# Patient Record
Sex: FEMALE | Race: WHITE | NOT HISPANIC OR LATINO | Employment: FULL TIME | URBAN - METROPOLITAN AREA
[De-identification: names, ages, dates, MRNs, and addresses within clinical notes are randomized per-mention and may not be internally consistent; named-entity substitution may affect disease eponyms.]

---

## 2017-08-21 ENCOUNTER — ALLSCRIPTS OFFICE VISIT (OUTPATIENT)
Dept: OTHER | Facility: OTHER | Age: 42
End: 2017-08-21

## 2017-08-21 DIAGNOSIS — I10 ESSENTIAL (PRIMARY) HYPERTENSION: ICD-10-CM

## 2017-08-21 DIAGNOSIS — E55.9 VITAMIN D DEFICIENCY: ICD-10-CM

## 2017-08-24 ENCOUNTER — GENERIC CONVERSION - ENCOUNTER (OUTPATIENT)
Dept: OTHER | Facility: OTHER | Age: 42
End: 2017-08-24

## 2018-01-09 NOTE — MISCELLANEOUS
Message  Return to work or school:   Hood Adamson is under my professional care   She was seen in my office on 11/7/2016   She is able to return to work on  11/8/2016       Andres Gonzalez MD       Signatures   Electronically signed by : Andres Gonzalez MD; Nov 7 2016  5:03PM EST                       (Author)

## 2018-01-13 VITALS
OXYGEN SATURATION: 98 % | SYSTOLIC BLOOD PRESSURE: 122 MMHG | RESPIRATION RATE: 18 BRPM | BODY MASS INDEX: 26.75 KG/M2 | HEIGHT: 62 IN | DIASTOLIC BLOOD PRESSURE: 76 MMHG | HEART RATE: 78 BPM | WEIGHT: 145.38 LBS | TEMPERATURE: 98.6 F

## 2018-01-13 NOTE — MISCELLANEOUS
Message   Recorded as Task   Date: 10/11/2016 02:54 PM, Created By: Celestino Schreiber   Task Name: Med Renewal Request   Assigned To: Koby Mantilla   Regarding Patient: Zeke Ying, Status: Active   CommentJerald Rocha - 11 Oct 2016 2:54 PM     TASK CREATED  PT CALLED FOR A REFILL ON HER TRINESSA LO TO BE SENT TO [facility] ON [address] - 11 Oct 2016 2:57 PM     TASK EDITED                 sent to pharm garcia due in dec        Active Problems    1  Acne (706 1) (L70 9)   2  Allergic rhinitis (477 9) (J30 9)   3  Allergy (995 3) (T78 40XA)   4  Contraceptive use (V25 40) (Z30 40)   5  Encounter for screening mammogram for malignant neoplasm of breast (V76 12)   (Z12 31)   6  Essential hypertriglyceridemia (272 1) (E78 1)   7  Hypertension (401 9) (I10)   8  Need for prophylactic vaccination and inoculation against influenza (V04 81) (Z23)   9  Routine Gynecological Exam With Cervical Pap Smear (V72 31)   10  Visit for screening mammogram (V76 12) (Z12 31)   11  Vitamin D deficiency (268 9) (E55 9)    Current Meds   1  Benzonatate 200 MG Oral Capsule; TAKE 1 CAPSULE 3 TIMES DAILY AS NEEDED; Therapy: 18CLK0930 to (Evaluate:17Jan2016)  Requested for: 63ZEE5627; Last   Rx:07Jan2016 Ordered   2  Bisoprolol-Hydrochlorothiazide 2 5-6 25 MG Oral Tablet (Ziac); Take 1 tablet daily; Therapy: 62Wmt6604 to (Jules Dalton)  Requested for: 84ABL2988; Last   Rx:10Lvn5884 Ordered   3  Calcium 1000 + D 1000-800 MG-UNIT Oral Tablet; Take 1 tablet daily; Therapy: 23Pct4538 to (Last Rx:69Qhs9756) Ordered   4  Fluticasone Propionate 50 MCG/ACT Nasal Suspension; USE 1-2 SPRAYS IN EACH   NOSTRIL ONCE DAILY PRN; Therapy: 20LLG7036 to (Last BOB:18URA7409)  Requested for: 89ZEN5236 Ordered   5  Ortho Tri-Cyclen Lo 0 18/0 215/0 25 MG-25 MCG Oral Tablet (Norgestim-Eth Estrad   Triphasic); TAKE ONE TABLET BY MOUTH EVERY DAY; Therapy: 68IMS4585 to (Evaluate:37Lis0828)  Requested for: 08Sep2015;  Last Rx:73Xrt2094 Ordered   6  Ortho Tri-Cyclen Lo 0 18/0 215/0 25 MG-25 MCG Oral Tablet; TAKE 1 TABLET DAILY; Therapy: 76UDV4125 to ([place])  Requested for: 87Oxo1296; Last   Rx:19Rwl4049 Ordered   7  Spironolactone 25 MG Oral Tablet (Aldactone); TAKE 1 TABLET DAILY; Therapy: 63Bhf4700 to (Evaluate:59Awf1679)  Requested for: 79FYA9222; Last   Rx:30Paa0945 Ordered    Allergies    1   No Known Drug Allergies    Plan  Acne, Health Maintenance    · Ortho Tri-Cyclen Lo 0 18/0 215/0 25 MG-25 MCG Oral Tablet (Norgestim-Eth  Estrad Triphasic); TAKE 1 TABLET DAILY    Signatures   Electronically signed by : Becky Hernandez, ; Oct 11 2016  2:57PM EST                       (Author)

## 2018-01-13 NOTE — RESULT NOTES
Verified Results  (1) CBC/PLT/DIFF 46TFA1018 11:05AM [facility]     Test Name Result Flag Reference   WBC COUNT 6 79 Thousand/uL  4 31-10 16   RBC COUNT 4 84 Million/uL  3 81-5 12   HEMOGLOBIN 14 3 g/dL  11 5-15 4   HEMATOCRIT 41 1 %  34 8-46  1   MCV 85 fL  82-98   MCH 29 5 pg  26 8-34 3   MCHC 34 8 g/dL  31 4-37 4   RDW 12 7 %  11 6-15 1   MPV 10 3 fL  8 9-12 7   PLATELET COUNT 018 Thousands/uL  149-390   nRBC AUTOMATED 0 /100 WBCs     NEUTROPHILS RELATIVE PERCENT 58 %  43-75   LYMPHOCYTES RELATIVE PERCENT 33 %  14-44   MONOCYTES RELATIVE PERCENT 7 %  4-12   EOSINOPHILS RELATIVE PERCENT 2 %  0-6   BASOPHILS RELATIVE PERCENT 0 %  0-1   NEUTROPHILS ABSOLUTE COUNT 3 98 Thousands/µL  1 85-7 62   LYMPHOCYTES ABSOLUTE COUNT 2 21 Thousands/µL  0 60-4 47   MONOCYTES ABSOLUTE COUNT 0 44 Thousand/µL  0 17-1 22   EOSINOPHILS ABSOLUTE COUNT 0 12 Thousand/µL  0 00-0 61   BASOPHILS ABSOLUTE COUNT 0 03 Thousands/µL  0 00-0 10     (1) COMPREHENSIVE METABOLIC PANEL 68MHD2535 29:25HP Lou Parikh 54 Kidney Disease Education Program recommendations are as follows:  GFR calculation is accurate only with a steady state creatinine  Chronic Kidney disease less than 60 ml/min/1 73 sq  meters  Kidney failure less than 15 ml/min/1 73 sq  meters  Test Name Result Flag Reference   GLUCOSE,RANDM 80 mg/dL     If the patient is fasting, the ADA then defines impaired fasting glucose as > 100 mg/dL and diabetes as > or equal to 123 mg/dL     SODIUM 137 mmol/L  136-145   POTASSIUM 4 3 mmol/L  3 5-5 3   CHLORIDE 102 mmol/L  100-108   CARBON DIOXIDE 28 mmol/L  21-32   ANION GAP (CALC) 7 mmol/L  4-13   BLOOD UREA NITROGEN 9 mg/dL  5-25   CREATININE 0 65 mg/dL  0 60-1 30   Standardized to IDMS reference method   CALCIUM 8 8 mg/dL  8 3-10 1   BILI, TOTAL 0 80 mg/dL  0 20-1 00   ALK PHOSPHATAS 45 U/L L    ALT (SGPT) 18 U/L  12-78   AST(SGOT) 14 U/L  5-45   ALBUMIN 3 6 g/dL  3 5-5 0   TOTAL PROTEIN 7 0 g/dL 6  4-8 2   eGFR Non-African American      >60 0 ml/min/1 73sq m     (1) TSH 55GNO8192 11:05AM [facility]   Patients undergoing fluorescein dye angiography may retain small amounts of fluorescein in the body for 48-72 hours post procedure  Samples containing fluorescein can produce falsely depressed TSH values  If the patient had this procedure,a specimen should be resubmitted post fluorescein clearance  The recommended reference ranges for TSH during pregnancy are as follows:  First trimester 0 1 to 2 5 uIU/mL  Second trimester  0 2 to 3 0 uIU/mL  Third trimester 0 3 to 3 0 uIU/m     Test Name Result Flag Reference   TSH 3 630 uIU/mL  0 358-3 740     (1) LIPID PANEL, FASTING 09CSZ3875 11:05AM [facility]   Triglyceride:         Normal              <150 mg/dl       Borderline High    150-199 mg/dl       High               200-499 mg/dl       Very High          >499 mg/dl  Cholesterol:         Desirable        <200 mg/dl      Borderline High  200-239 mg/dl      High             >239 mg/dl  HDL Cholesterol:        High    >59 mg/dL      Low     <41 mg/dL  LDL CALCULATED:    This screening LDL is a calculated result  It does not have the accuracy of the Direct Measured LDL in the monitoring of patients with hyperlipidemia and/or statin therapy  Direct Measure LDL (IOD043) must be ordered separately in these patients  Test Name Result Flag Reference   CHOLESTEROL 229 mg/dL H    HDL,DIRECT 45 mg/dL  40-60   LDL CHOLESTEROL CALCULATED 128 mg/dL H 0-100   TRIGLYCERIDES 282 mg/dL H <=150     (1) VITAMIN D 25-HYDROXY 59HNJ4513 11:05AM [facility]     Test Name Result Flag Reference   VIT D 25-HYDROX 33 8 ng/mL  30 0-100 0     (1) ALLERGY, NORTHEAST PANEL ADULT 59VIC0113 11:05AM [facility]   As in all diagnostic testing, a diagnosis should be made by the physician based on both test results and patient clinical history       Test Name Result Flag Reference   ALTERNARIA ALTERNATA 0 28 kUA/I H 0 00-0 09   ASPERGILLUS FUMIGATUS <0 10 kUA/I  0 00-0 09   BERMUDA GRASS <0 10 kUA/I  0 00-0 09   ALLERGEN MAPLE/BOX ELDER 0 29 kUA/I H 0 00-0 09   ALLERGEN CAT EPITHELIUM-DANDER <0 10 kUA/I  0 00-0 09   CLADOSPORIUM HERBARUM <0 10 kUA/I  0 00-0 09   COCKROACH 0 31 kUA/I H 0 00-0 09   ALLERGEN, COMMON SILVER BIRCH 3 19 kUA/I H 0 00-0 09   ALLERGEN, C[place] 0 13 kUA/I H 0 00-0 09   D  FARINAE 0 14 kUA/I H 0 00-0 09   D   PTERONYSSINUS 0 22 kUA/I H 0 00-0 09   DOG DANDER <0 10 kUA/I  0 00-0 09   ALLERGEN ELM 0 22 kUA/I H 0 00-0 09   M[place] TREE <0 10 kUA/I  0 00-0 09   MOUSE URINE <0 10 kUA/I  0 00-0 09   ALLERGEN MUGWORT IGE <0 10 kUA/I  0 00-0 09   MULBERRY TREE <0 10 kUA/I  0 00-0 09   ALLERGEN, [place] 1 29 kUA/I H 0 00-0 09   PENICILLIUM CHRYSOGENUM <0 10 kUA/I  0 00-0 09   ALLERGEN ROUGH PIGWEED (W14) IGE <0 10 kUA/I  0 00-0 09   COMMON RAGWEED 0 13 kUA/I H 0 00-0 09   ALLERGEN SHEEP SORREL (W18) IGE 0 11 kUA/I H 0 00-0 09   [place] TREE 0 13 kUA/I H 0 00-0 09   ISABEL GRASS <0 10 kUA/I  0 00-0 09   W[place] TREE 0 16 kUA/I H 0 00-0 09   WHITE LUCI TREE 4 82 kUA/I H 0 00-0 09   TOTAL  kU/l H 0-113   ALLERGEN COMMENT See Below

## 2018-01-16 NOTE — PROGRESS NOTES
Assessment    1  Encounter for preventive health examination (V70 0) (Z00 00)   2  Allergic rhinitis (477 9) (J30 9)   3  Acne (706 1) (L70 9)   4  Essential hypertriglyceridemia (272 1) (E78 1)   5  Hypertension (401 9) (I10)    Plan  Acne, Health Maintenance    · Ortho Tri-Cyclen Lo 0 18/0 215/0 25 MG-25 MCG Oral Tablet (Norgestim-Eth Estrad  Triphasic)  Allergic rhinitis    · ProAir  (90 Base) MCG/ACT Inhalation Aerosol Solution; INHALE 1 TO 2  PUFFS EVERY 4 TO 6 HOURS AS NEEDED  Contraceptive use    · Ortho Tri-Cyclen Lo 0 18/0 215/0 25 MG-25 MCG Oral Tablet (Norgestim-Eth Estrad  Triphasic)  Encounter for screening mammogram for malignant neoplasm of breast    · * MAMMO SCREENING BILATERAL W CAD; Status:Hold For - Scheduling; Requested  for:07Nov2016;   Essential hypertriglyceridemia    · (1) CBC/PLT/DIFF; Status:Active; Requested for:07Nov2016;    · (1) COMPREHENSIVE METABOLIC PANEL; Status:Active; Requested for:07Nov2016;    · (1) LIPID PANEL, FASTING; Status:Active; Requested for:07Nov2016;   Essential hypertriglyceridemia, Hypertension    · (1) TSH; Status:Active; Requested NTV:66ABM5551;   SocHx: Working Full Time    · Fluticasone Propionate 50 MCG/ACT Nasal Suspension  Vitamin D deficiency    · (1) VITAMIN D 25-HYDROXY; Status:Active; Requested for:07Nov2016;     Discussion/Summary  health maintenance visit Currently, she eats an adequate diet  cervical cancer screening is needed every three years Breast cancer screening: mammogram has been ordered  Colorectal cancer screening: colorectal cancer screening is not indicated  Osteoporosis screening: bone mineral density testing is not indicated  Screening lab work includes hemoglobin, glucose, lipid profile, thyroid function testing and 25-hydroxyvitamin D  The immunizations are up to date  Advice and education were given regarding nutrition, aerobic exercise and vitamin D supplements  Patient discussion: discussed with the patient         1  Rhinitis, allergy  Symptoms improved, saw allergy  Now using rescue inhaler as needed, off nasal sprays  2  HTN  BP controlled on bisoprolol and HCTZ, due for labs  3  Hyperlipidemia  Recheck lipids, especially TG  Congratulated with 10 lb weight loss  4  Vitamin D deficiency  On daily supplement  5  Acne, baldness  On daily spironolactone  6  HM  Vaccinations updated  Mammogram due  Follow up in 8 months or prn  The patient was counseled regarding instructions for management, impressions  Chief Complaint  physical      History of Present Illness  HM, Adult Female: The patient is being seen for a health maintenance evaluation  General Health: The patient's health since the last visit is described as good  She denies vision problems  Immunizations status: up to date  Lifestyle:  She has weight concerns  Reproductive health:  she reports normal menses  Screening: Cervical cancer screening includes a pap smear performed last year  Breast cancer screening includes a mammogram performed last year  She hasn't been previously screened for colorectal cancer  Metabolic screening includes lipid profile performed , glucose screening performed , thyroid function test performed  and no previous DEXA  Cardiovascular risk factors: hypertension, stress and sedentary lifestyle  Safety elements used: seat belt  HPI: Ms Sintia Moon feels well  She saw the allergist a few months ago, reports her nasal congestion has improved  She is not using the Dymista inhaler anymore, uses ProAir as needed for her breathing  She joined [employer], has lost some weight  She donates blood regularly, BP usually 120/70s  Rhinitis (Brief): The patient is being seen for a routine clinic follow-up of rhinitis  Symptoms include:  nasal congestion, but no postnasal drainage and no cough  Hypertriglyceridemia (Brief): The patient is being seen for a routine clinic follow-up of hypertriglyceridemia   No associated symptoms are reported  The patient is not currently being treated for this problem  Hypertension (Follow-Up): The patient states she has been stable with her blood pressure control since the last visit  Symptoms: The patient is currently asymptomatic  Associated symptoms include no headache  Home monitoring: The patient checks her blood pressure regularly  Blood pressure control has been good  Medications: the patient is adherent with her medication regimen  Disease Management: the patient is doing well with her blood pressure goals  The patient is due for a lipid panel and a serum creatinine  Review of Systems    Constitutional: not feeling tired  Eyes: no eyesight problems  ENT: no nosebleeds and no nasal discharge  Cardiovascular: no chest pain, no palpitations and no lower extremity edema  Respiratory: no shortness of breath and no cough  Gastrointestinal: no abdominal pain  Genitourinary: no dysuria  Musculoskeletal: no arthralgias  Integumentary: no rashes  Neurological: no headache and no dizziness  Psychiatric: no sleep disturbances  no feelings of weakness      Active Problems    1  Acne (706 1) (L70 9)   2  Allergic rhinitis (477 9) (J30 9)   3  Allergy (995 3) (T78 40XA)   4  Contraceptive use (V25 40) (Z30 40)   5  Encounter for screening mammogram for malignant neoplasm of breast (V76 12)   (Z12 31)   6  Essential hypertriglyceridemia (272 1) (E78 1)   7  Hypertension (401 9) (I10)   8  Need for prophylactic vaccination and inoculation against influenza (V04 81) (Z23)   9  Routine Gynecological Exam With Cervical Pap Smear (V72 31)   10  Visit for screening mammogram (V76 12) (Z12 31)   11   Vitamin D deficiency (268 9) (E55 9)    Past Medical History    · History of Abnormal mammography (793 80) (R92 8)   · History of Anxiety (300 00) (F41 9)   · History of hypertension (V12 59) (Z86 79)   · Need for prophylactic vaccination and inoculation against influenza (V04 81) (Z23)   · History of Tendonitis Supraspinatus (726 10)    Surgical History    · History of Breast Surgery Reduction Procedure    Family History  Mother    · Family history of osteopenia (V17 89) (Z82 69)   · Family history of Osteoporosis (V17 81)  Father    · Family history of Carcinoma Of The Stomach (V16 0)   · Family history of malignant neoplasm of stomach (V16 0) (Z80 0)   · Family history of osteopenia (V17 89) (Z82 69)  Paternal Grandmother    · Family history of Breast Cancer (V16 3)  Maternal Grandfather    · Family history of Colon Cancer (V16 0)  Paternal Uncle    · Family history of Prostate Cancer (V16 42)  Family History    · Family history of Heart Disease (V17 49)   · Family history of Stroke Syndrome (V17 1)    Social History    · Being A Social Drinker   · Birth Control Method - Oral Contraceptives   · Childlessness, voluntary (V25 9) (Z30 9)   · Daily Coffee Consumption (___ Cups/Day)   · Engaged to be    · Exercise Frequency (Times/Week)   · Never A Smoker   · Working Full Time   ·  for [employer]    Current Meds   1  Bisoprolol-Hydrochlorothiazide 2 5-6 25 MG Oral Tablet; Take 1 tablet daily; Therapy: 17Weg6440 to (Elvi Chen)  Requested for: 76WQE2115; Last   Rx:94Jam3987 Ordered   2  Calcium 1000 + D 1000-800 MG-UNIT Oral Tablet; Take 1 tablet daily; Therapy: 90Wos0049 to (Last Rx:63Fep9125) Ordered   3  Fluticasone Propionate 50 MCG/ACT Nasal Suspension; USE 1-2 SPRAYS IN EACH   NOSTRIL ONCE DAILY PRN; Therapy: 23YLV0220 to (Last ROBLES:32VEF6271)  Requested for: 43BNM8997 Ordered   4  Ortho Tri-Cyclen Lo 0 18/0 215/0 25 MG-25 MCG Oral Tablet; TAKE 1 TABLET DAILY; Therapy: 94QXY0672 to (Evaluate:03Jan2017)  Requested for: 14Oct2016; Last   Rx:37Rwl0851 Ordered   5  Ortho Tri-Cyclen Lo 0 18/0 215/0 25 MG-25 MCG Oral Tablet; TAKE ONE TABLET BY   MOUTH EVERY DAY; Therapy: 30TOT5607 to (Evaluate:43Uvc3773)  Requested for: 28Ltd4765;  Last   Rx:81Kcx9339 Ordered   6  Spironolactone 25 MG Oral Tablet; TAKE 1 TABLET DAILY; Therapy: 71Fno7508 to (Aracely Cabrera)  Requested for: 55MFV1574; Last   Rx:94Pux6579 Ordered   7  Adam Lo 0 18/0 215/0 25 MG-25 MCG Oral Tablet; take 1 tablet by mouth every day; Therapy: 27CYO6027 to (Evaluate:08Jan2017)  Requested for: 75KNI9426; Last   Rx:92Cry8713 Ordered    The medication list was reviewed and updated today  Allergies    1  No Known Drug Allergies    Vitals   Recorded: 55TPR2862 62:29RF   Systolic 510   Diastolic 78   Heart Rate 72   Respiration 16   O2 Saturation 97   Height 5 ft 1 5 in   Weight 143 lb    BMI Calculated 26 58   BSA Calculated 1 65     Physical Exam    Constitutional   General appearance: No acute distress, well appearing and well nourished  appears healthy, comfortable, clothing appropriate and well hydrated  Head and Face   Head and face: Normal     Eyes   Pupils and irises: Equal, round, reactive to light  Ears, Nose, Mouth, and Throat   External inspection of ears and nose: Normal     Otoscopic examination: Tympanic membranes translucent with normal light reflex  Canals patent without erythema  Nasal mucosa, septum, and turbinates: Normal without edema or erythema  no nasal discharge  The bilateral nasal mucosa was not red  Oropharynx: Normal with no erythema, edema, exudate or lesions  Neck   Neck: Supple, symmetric, trachea midline, no masses  Pulmonary   Respiratory effort: No increased work of breathing or signs of respiratory distress  Auscultation of lungs: Clear to auscultation  Cardiovascular   Auscultation of heart: Normal rate and rhythm, normal S1 and S2, no murmurs  Examination of extremities for edema and/or varicosities: Normal     Abdomen   Abdomen: Non-tender, no masses  Lymphatic   Palpation of lymph nodes in neck: No lymphadenopathy      Musculoskeletal   Gait and station: Normal     Skin   Skin and subcutaneous tissue: Normal without rashes or lesions  Neurologic   Cortical function: Normal mental status  Psychiatric   Orientation to person, place, and time: Normal     Mood and affect: Normal        Results/Data  PHQ-2 Adult Depression Screening 10SRA0124 04:22PM User, s     Test Name Result Flag Reference   PHQ-2 Adult Depression Score 0     Over the last two weeks, how often have you been bothered by any of the following problems?   Little interest or pleasure in doing things: Not at all - 0  Feeling down, depressed, or hopeless: Not at all - 0   PHQ-2 Adult Depression Screening Negative         Future Appointments    Date/Time Provider Specialty Site   06/12/2017 10:00 AM Joon Parikh MD Internal Medicine [address]   Electronically signed by : Mendel Chase, MD; Nov 7 2016  4:39PM EST                       (Author)

## 2018-01-18 NOTE — MISCELLANEOUS
Message  Return to work or school:   Lion Hoff is under my professional care  She was seen in my office on 08/21/2017        JESSI Klein MD       Signatures   Electronically signed by : Sandy Collazo MD; Aug 25 2017  5:14PM EST                       (Author)

## 2018-03-11 PROBLEM — D25.9 FIBROID, UTERINE: Status: ACTIVE | Noted: 2017-08-21

## 2018-03-15 ENCOUNTER — TELEPHONE (OUTPATIENT)
Dept: INTERNAL MEDICINE CLINIC | Facility: CLINIC | Age: 43
End: 2018-03-15

## 2018-03-15 DIAGNOSIS — I10 ESSENTIAL HYPERTENSION: Primary | ICD-10-CM

## 2018-03-15 RX ORDER — SPIRONOLACTONE 25 MG/1
25 TABLET ORAL DAILY
Qty: 90 TABLET | Refills: 1 | Status: SHIPPED | OUTPATIENT
Start: 2018-03-15 | End: 2018-07-30 | Stop reason: SDUPTHER

## 2018-03-15 RX ORDER — BISOPROLOL FUMARATE AND HYDROCHLOROTHIAZIDE 2.5; 6.25 MG/1; MG/1
1 TABLET ORAL DAILY
COMMUNITY
Start: 2011-08-08 | End: 2018-03-15 | Stop reason: SDUPTHER

## 2018-03-15 RX ORDER — BISOPROLOL FUMARATE AND HYDROCHLOROTHIAZIDE 2.5; 6.25 MG/1; MG/1
1 TABLET ORAL DAILY
Qty: 90 TABLET | Refills: 0 | Status: SHIPPED | OUTPATIENT
Start: 2018-03-15 | End: 2018-07-30 | Stop reason: SDUPTHER

## 2018-03-15 RX ORDER — SPIRONOLACTONE 25 MG/1
1 TABLET ORAL DAILY
COMMUNITY
Start: 2013-12-23 | End: 2018-03-15 | Stop reason: SDUPTHER

## 2018-07-30 ENCOUNTER — OFFICE VISIT (OUTPATIENT)
Dept: INTERNAL MEDICINE CLINIC | Facility: CLINIC | Age: 43
End: 2018-07-30
Payer: COMMERCIAL

## 2018-07-30 VITALS
SYSTOLIC BLOOD PRESSURE: 134 MMHG | HEIGHT: 62 IN | TEMPERATURE: 98 F | HEART RATE: 82 BPM | WEIGHT: 136.8 LBS | OXYGEN SATURATION: 98 % | BODY MASS INDEX: 25.17 KG/M2 | RESPIRATION RATE: 16 BRPM | DIASTOLIC BLOOD PRESSURE: 78 MMHG

## 2018-07-30 DIAGNOSIS — E78.1 ESSENTIAL HYPERTRIGLYCERIDEMIA: ICD-10-CM

## 2018-07-30 DIAGNOSIS — Z00.00 HEALTH MAINTENANCE EXAMINATION: Primary | ICD-10-CM

## 2018-07-30 DIAGNOSIS — J45.20 MILD INTERMITTENT REACTIVE AIRWAY DISEASE WITHOUT COMPLICATION: ICD-10-CM

## 2018-07-30 DIAGNOSIS — L70.8 OTHER ACNE: ICD-10-CM

## 2018-07-30 DIAGNOSIS — E55.9 VITAMIN D DEFICIENCY: ICD-10-CM

## 2018-07-30 DIAGNOSIS — J30.89 NON-SEASONAL ALLERGIC RHINITIS DUE TO OTHER ALLERGIC TRIGGER: ICD-10-CM

## 2018-07-30 DIAGNOSIS — I10 ESSENTIAL HYPERTENSION: ICD-10-CM

## 2018-07-30 PROBLEM — J45.909 REACTIVE AIRWAY DISEASE WITHOUT COMPLICATION: Status: ACTIVE | Noted: 2018-07-30

## 2018-07-30 PROCEDURE — 99396 PREV VISIT EST AGE 40-64: CPT | Performed by: INTERNAL MEDICINE

## 2018-07-30 RX ORDER — ALBUTEROL SULFATE 90 UG/1
1-2 AEROSOL, METERED RESPIRATORY (INHALATION)
COMMUNITY
Start: 2016-11-07 | End: 2018-07-30 | Stop reason: SDUPTHER

## 2018-07-30 RX ORDER — ALBUTEROL SULFATE 90 UG/1
1-2 AEROSOL, METERED RESPIRATORY (INHALATION) EVERY 6 HOURS PRN
Qty: 18 G | Refills: 1 | Status: SHIPPED | OUTPATIENT
Start: 2018-07-30 | End: 2021-01-13 | Stop reason: SDUPTHER

## 2018-07-30 RX ORDER — SPIRONOLACTONE 25 MG/1
25 TABLET ORAL DAILY
Qty: 90 TABLET | Refills: 1 | Status: SHIPPED | OUTPATIENT
Start: 2018-07-30 | End: 2019-02-22 | Stop reason: SDUPTHER

## 2018-07-30 RX ORDER — BISOPROLOL FUMARATE AND HYDROCHLOROTHIAZIDE 2.5; 6.25 MG/1; MG/1
1 TABLET ORAL DAILY
Qty: 90 TABLET | Refills: 1 | Status: SHIPPED | OUTPATIENT
Start: 2018-07-30 | End: 2019-02-22 | Stop reason: SDUPTHER

## 2018-07-30 NOTE — LETTER
July 30, 2018     Patient: Noemí Leal   YOB: 1975   Date of Visit: 7/30/2018       To Whom it May Concern:    [place] is under my professional care  She was seen in my office on 7/30/2018  She may return to work on Tuesday, 7/31/18  If you have any questions or concerns, please don't hesitate to call           Sincerely,          Maine Astorga MD

## 2018-07-30 NOTE — PROGRESS NOTES
Assessment/Plan:    Reactive airway disease without complication  Instructed to use albuterol inhaler before going in the building for work  Monitor symptoms  Allergic rhinitis  Continue daily steroid nasal spray, may use saline nasal spray as needed  Essential hypertension  BP controlled on low-dose bisoprolol and HCTZ  Acne  On low-dose spironolactone  Essential hypertriglyceridemia  Due for labs, not on medication  Vitamin D deficiency  On daily supplement  Diagnoses and all orders for this visit:    Health maintenance examination  Comments:  Mammogram updated  Tdap 2011  Essential hypertension  -     spironolactone (ALDACTONE) 25 mg tablet; Take 1 tablet (25 mg total) by mouth daily  -     bisoprolol-hydrochlorothiazide (ZIAC) 2 5-6 25 MG per tablet; Take 1 tablet by mouth daily  -     CBC and differential  -     Comprehensive metabolic panel  -     TSH, 3rd generation with Free T4 reflex    Essential hypertriglyceridemia  -     Comprehensive metabolic panel  -     Lipid panel    Vitamin D deficiency  -     Vitamin D 25 hydroxy    Non-seasonal allergic rhinitis due to other allergic trigger    Other acne    Mild intermittent reactive airway disease without complication  -     albuterol (PROAIR HFA) 90 mcg/act inhaler; Inhale 1-2 puffs every 6 (six) hours as needed for wheezing    Other orders  -     Calcium Carb-Cholecalciferol 1000-800 MG-UNIT TABS; Take 1 tablet by mouth daily  -     Discontinue: albuterol (PROAIR HFA) 90 mcg/act inhaler; Inhale 1-2 puffs      Follow up in 1 year or as needed  Subjective:      Patient ID: Adrianna Bhatt is a 37 y o  female  Margo complains of more frequent allergy symptoms  She has more frequent sinus congestion and post nasal drip  She reports occasional chest tightness, no wheezing when she is at work  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, they has been a lot of construction and has been using a lot of sprays for cleaning    When this occurs, she notice mild chest tightness, would use her Promar as needed  She does not use this daily  She denies any symptoms during or after exercising, no nocturnal symptoms  She has been using her Flonase almost daily  She reports her surgery for her fibroids went well  She denies any vaginal bleeding or other symptoms  She will be traveling to [facility] next year, concerned about vaccinations  She recalls that she had a tetanus vaccine in 2011  The following portions of the patient's history were reviewed and updated as appropriate: allergies, current medications, past medical history, past social history and problem list     Review of Systems   Constitutional: Negative for appetite change and fatigue  HENT: Negative for congestion, ear pain and postnasal drip  Eyes: Negative for visual disturbance  Respiratory: Negative for cough and shortness of breath  Cardiovascular: Negative for chest pain and leg swelling  Gastrointestinal: Negative for abdominal pain, constipation and diarrhea  Genitourinary: Negative for dysuria, frequency and urgency  Musculoskeletal: Negative for arthralgias and myalgias  Skin: Negative for rash and wound  Neurological: Negative for dizziness, numbness and headaches  Hematological: Does not bruise/bleed easily  Psychiatric/Behavioral: Negative for confusion  The patient is not nervous/anxious  Objective:      /78   Pulse 82   Temp 98 °F (36 7 °C)   Resp 16   Ht 5' 1 5" (1 562 m)   Wt 62 1 kg (136 lb 12 8 oz)   SpO2 98%   BMI 25 43 kg/m²          Physical Exam   Constitutional: She is oriented to person, place, and time  She appears well-developed and well-nourished  HENT:   Head: Normocephalic and atraumatic  Nose: Nose normal    Eyes: Conjunctivae are normal  Pupils are equal, round, and reactive to light  Neck: Neck supple  Cardiovascular: Normal rate, regular rhythm and normal heart sounds  No edema     Pulmonary/Chest: Effort normal and breath sounds normal  She has no wheezes  She has no rales  Abdominal: Soft  Bowel sounds are normal    Neurological: She is alert and oriented to person, place, and time  Skin: Skin is warm  No rash noted  Psychiatric: She has a normal mood and affect  Her behavior is normal    Nursing note and vitals reviewed

## 2018-07-31 ENCOUNTER — APPOINTMENT (OUTPATIENT)
Dept: LAB | Age: 43
End: 2018-07-31
Payer: COMMERCIAL

## 2018-07-31 LAB
25(OH)D3 SERPL-MCNC: 30.3 NG/ML (ref 30–100)
ALBUMIN SERPL BCP-MCNC: 3.8 G/DL (ref 3.5–5)
ALP SERPL-CCNC: 57 U/L (ref 46–116)
ALT SERPL W P-5'-P-CCNC: 21 U/L (ref 12–78)
ANION GAP SERPL CALCULATED.3IONS-SCNC: 7 MMOL/L (ref 4–13)
AST SERPL W P-5'-P-CCNC: 15 U/L (ref 5–45)
BASOPHILS # BLD AUTO: 0.06 THOUSANDS/ΜL (ref 0–0.1)
BASOPHILS NFR BLD AUTO: 1 % (ref 0–1)
BILIRUB SERPL-MCNC: 1.19 MG/DL (ref 0.2–1)
BUN SERPL-MCNC: 10 MG/DL (ref 5–25)
CALCIUM SERPL-MCNC: 8.7 MG/DL (ref 8.3–10.1)
CHLORIDE SERPL-SCNC: 103 MMOL/L (ref 100–108)
CHOLEST SERPL-MCNC: 194 MG/DL (ref 50–200)
CO2 SERPL-SCNC: 26 MMOL/L (ref 21–32)
CREAT SERPL-MCNC: 0.71 MG/DL (ref 0.6–1.3)
EOSINOPHIL # BLD AUTO: 0.1 THOUSAND/ΜL (ref 0–0.61)
EOSINOPHIL NFR BLD AUTO: 1 % (ref 0–6)
ERYTHROCYTE [DISTWIDTH] IN BLOOD BY AUTOMATED COUNT: 13.2 % (ref 11.6–15.1)
GFR SERPL CREATININE-BSD FRML MDRD: 103 ML/MIN/1.73SQ M
GLUCOSE P FAST SERPL-MCNC: 79 MG/DL (ref 65–99)
HCT VFR BLD AUTO: 43.8 % (ref 34.8–46.1)
HDLC SERPL-MCNC: 36 MG/DL (ref 40–60)
HGB BLD-MCNC: 14.4 G/DL (ref 11.5–15.4)
IMM GRANULOCYTES # BLD AUTO: 0.02 THOUSAND/UL (ref 0–0.2)
IMM GRANULOCYTES NFR BLD AUTO: 0 % (ref 0–2)
LDLC SERPL CALC-MCNC: 116 MG/DL (ref 0–100)
LYMPHOCYTES # BLD AUTO: 2.27 THOUSANDS/ΜL (ref 0.6–4.47)
LYMPHOCYTES NFR BLD AUTO: 30 % (ref 14–44)
MCH RBC QN AUTO: 28.7 PG (ref 26.8–34.3)
MCHC RBC AUTO-ENTMCNC: 32.9 G/DL (ref 31.4–37.4)
MCV RBC AUTO: 87 FL (ref 82–98)
MONOCYTES # BLD AUTO: 0.53 THOUSAND/ΜL (ref 0.17–1.22)
MONOCYTES NFR BLD AUTO: 7 % (ref 4–12)
NEUTROPHILS # BLD AUTO: 4.58 THOUSANDS/ΜL (ref 1.85–7.62)
NEUTS SEG NFR BLD AUTO: 61 % (ref 43–75)
NONHDLC SERPL-MCNC: 158 MG/DL
NRBC BLD AUTO-RTO: 0 /100 WBCS
PLATELET # BLD AUTO: 253 THOUSANDS/UL (ref 149–390)
PMV BLD AUTO: 10.9 FL (ref 8.9–12.7)
POTASSIUM SERPL-SCNC: 4.2 MMOL/L (ref 3.5–5.3)
PROT SERPL-MCNC: 7.6 G/DL (ref 6.4–8.2)
RBC # BLD AUTO: 5.01 MILLION/UL (ref 3.81–5.12)
SODIUM SERPL-SCNC: 136 MMOL/L (ref 136–145)
T4 FREE SERPL-MCNC: 0.76 NG/DL (ref 0.76–1.46)
TRIGL SERPL-MCNC: 212 MG/DL
TSH SERPL DL<=0.05 MIU/L-ACNC: 4.31 UIU/ML (ref 0.36–3.74)
WBC # BLD AUTO: 7.56 THOUSAND/UL (ref 4.31–10.16)

## 2018-07-31 PROCEDURE — 80053 COMPREHEN METABOLIC PANEL: CPT | Performed by: INTERNAL MEDICINE

## 2018-07-31 PROCEDURE — 82306 VITAMIN D 25 HYDROXY: CPT | Performed by: INTERNAL MEDICINE

## 2018-07-31 PROCEDURE — 36415 COLL VENOUS BLD VENIPUNCTURE: CPT | Performed by: INTERNAL MEDICINE

## 2018-07-31 PROCEDURE — 85025 COMPLETE CBC W/AUTO DIFF WBC: CPT | Performed by: INTERNAL MEDICINE

## 2018-07-31 PROCEDURE — 84443 ASSAY THYROID STIM HORMONE: CPT | Performed by: INTERNAL MEDICINE

## 2018-07-31 PROCEDURE — 80061 LIPID PANEL: CPT | Performed by: INTERNAL MEDICINE

## 2018-07-31 PROCEDURE — 84439 ASSAY OF FREE THYROXINE: CPT | Performed by: INTERNAL MEDICINE

## 2018-08-01 ENCOUNTER — TELEPHONE (OUTPATIENT)
Dept: INTERNAL MEDICINE CLINIC | Facility: CLINIC | Age: 43
End: 2018-08-01

## 2018-08-01 DIAGNOSIS — R79.89 ABNORMAL THYROID BLOOD TEST: Primary | ICD-10-CM

## 2018-08-01 NOTE — TELEPHONE ENCOUNTER
Cholesterol a bit better, triglycerides and bad cholesterol (LDL) still a bit high  Continue low fat diet  Thyroid test a bit abnormal  Since feeling well, will recheck in a month or so  If you start feeling more tired, sluggish, constipated etc let me know, may need to start treatment  Continue daily D3, at least 1000 units  Rest of labs ok

## 2018-11-04 ENCOUNTER — TRANSCRIBE ORDERS (OUTPATIENT)
Dept: LAB | Facility: HOSPITAL | Age: 43
End: 2018-11-04

## 2018-11-04 ENCOUNTER — APPOINTMENT (OUTPATIENT)
Dept: LAB | Facility: HOSPITAL | Age: 43
End: 2018-11-04
Payer: COMMERCIAL

## 2018-11-04 DIAGNOSIS — R79.89 HYPOURICEMIA: Primary | ICD-10-CM

## 2018-11-04 DIAGNOSIS — R79.89 HYPOURICEMIA: ICD-10-CM

## 2018-11-04 LAB — TSH SERPL DL<=0.05 MIU/L-ACNC: 3.5 UIU/ML (ref 0.36–3.74)

## 2018-11-04 PROCEDURE — 36415 COLL VENOUS BLD VENIPUNCTURE: CPT

## 2018-11-04 PROCEDURE — 84443 ASSAY THYROID STIM HORMONE: CPT

## 2018-11-05 ENCOUNTER — TELEPHONE (OUTPATIENT)
Dept: INTERNAL MEDICINE CLINIC | Facility: CLINIC | Age: 43
End: 2018-11-05

## 2018-11-05 NOTE — TELEPHONE ENCOUNTER
----- Message from Jerson Smith MD sent at 11/5/2018  9:36 AM EST -----  Repeat thyroid test is normal

## 2019-01-03 ENCOUNTER — TELEPHONE (OUTPATIENT)
Dept: INTERNAL MEDICINE CLINIC | Facility: CLINIC | Age: 44
End: 2019-01-03

## 2019-01-03 DIAGNOSIS — Z29.8 NEED FOR MALARIA PROPHYLAXIS: Primary | ICD-10-CM

## 2019-01-03 PROBLEM — Z29.89 NEED FOR MALARIA PROPHYLAXIS: Status: ACTIVE | Noted: 2019-01-03

## 2019-01-03 RX ORDER — ATOVAQUONE AND PROGUANIL HYDROCHLORIDE 250; 100 MG/1; MG/1
1 TABLET, FILM COATED ORAL DAILY
Qty: 16 TABLET | Refills: 0 | Status: SHIPPED | OUTPATIENT
Start: 2019-01-03 | End: 2019-07-29 | Stop reason: ALTCHOICE

## 2019-02-22 DIAGNOSIS — I10 ESSENTIAL HYPERTENSION: ICD-10-CM

## 2019-02-22 RX ORDER — BISOPROLOL FUMARATE AND HYDROCHLOROTHIAZIDE 2.5; 6.25 MG/1; MG/1
1 TABLET ORAL DAILY
Qty: 90 TABLET | Refills: 1 | Status: SHIPPED | OUTPATIENT
Start: 2019-02-22 | End: 2019-09-23 | Stop reason: SDUPTHER

## 2019-02-22 RX ORDER — SPIRONOLACTONE 25 MG/1
25 TABLET ORAL DAILY
Qty: 90 TABLET | Refills: 1 | Status: SHIPPED | OUTPATIENT
Start: 2019-02-22 | End: 2019-09-23 | Stop reason: SDUPTHER

## 2019-07-29 ENCOUNTER — OFFICE VISIT (OUTPATIENT)
Dept: INTERNAL MEDICINE CLINIC | Facility: CLINIC | Age: 44
End: 2019-07-29
Payer: COMMERCIAL

## 2019-07-29 VITALS
SYSTOLIC BLOOD PRESSURE: 122 MMHG | TEMPERATURE: 97.9 F | HEART RATE: 80 BPM | WEIGHT: 141 LBS | DIASTOLIC BLOOD PRESSURE: 80 MMHG | OXYGEN SATURATION: 98 % | BODY MASS INDEX: 25.95 KG/M2 | RESPIRATION RATE: 18 BRPM | HEIGHT: 62 IN

## 2019-07-29 DIAGNOSIS — E78.2 MIXED HYPERLIPIDEMIA: ICD-10-CM

## 2019-07-29 DIAGNOSIS — I10 ESSENTIAL HYPERTENSION: ICD-10-CM

## 2019-07-29 DIAGNOSIS — E78.1 ESSENTIAL HYPERTRIGLYCERIDEMIA: ICD-10-CM

## 2019-07-29 DIAGNOSIS — Z00.00 HEALTH MAINTENANCE EXAMINATION: Primary | ICD-10-CM

## 2019-07-29 DIAGNOSIS — J30.89 NON-SEASONAL ALLERGIC RHINITIS DUE TO OTHER ALLERGIC TRIGGER: ICD-10-CM

## 2019-07-29 DIAGNOSIS — L70.8 OTHER ACNE: ICD-10-CM

## 2019-07-29 DIAGNOSIS — E55.9 VITAMIN D DEFICIENCY: ICD-10-CM

## 2019-07-29 PROBLEM — Z29.8 NEED FOR MALARIA PROPHYLAXIS: Status: RESOLVED | Noted: 2019-01-03 | Resolved: 2019-07-29

## 2019-07-29 PROBLEM — Z29.89 NEED FOR MALARIA PROPHYLAXIS: Status: RESOLVED | Noted: 2019-01-03 | Resolved: 2019-07-29

## 2019-07-29 PROCEDURE — 99396 PREV VISIT EST AGE 40-64: CPT | Performed by: INTERNAL MEDICINE

## 2019-07-29 NOTE — PROGRESS NOTES
Assessment/Plan:    Mixed hyperlipidemia  Lipids due, not on medication  Allergic rhinitis  Symptoms worse during spring time, takes antihistamine prn  Sees allergy once a year  Acne  On spironolactone  Essential hypertension  BP stable, on bisoprolol and HCTZ  Reactive airway disease without complication  Instructed to use Advair if using rescue inhaler daily  Diagnoses and all orders for this visit:    Health maintenance examination  Comments:  Mammogram, PAPs and eye exam scheduled  Essential hypertriglyceridemia  -     Comprehensive metabolic panel  -     Lipid panel    Essential hypertension  -     CBC and differential  -     TSH, 3rd generation with Free T4 reflex    Vitamin D deficiency  -     Vitamin D 25 hydroxy    Other acne    Non-seasonal allergic rhinitis due to other allergic trigger    Mixed hyperlipidemia      Follow up in 1 year or as needed  Subjective:      Patient ID: Melina Trejo is a 53 y o  female  [place] has been feeling well  She reports having a bad cold earlier this year, needed to use her albuterol inhaler several times a day  She went to an urgent care center and was given steroid and a Z-waldo  She did see her allergist then, was given Advair which she did not use  She was worried to take any antibiotics since she was repairing for trip to [facility]  She eventually took the antibiotics when she returned home, cough improved after 2 days  She mainly uses her inhaler during early spring months  She sees her allergies once a year  She exercises regularly, goes to gym and joint several classes  The following portions of the patient's history were reviewed and updated as appropriate: allergies, current medications, past medical history, past social history and problem list     Review of Systems   Constitutional: Negative for appetite change and fatigue  HENT: Negative for congestion, ear pain and postnasal drip  Eyes: Negative for visual disturbance  Respiratory: Negative for cough, shortness of breath and wheezing  Cardiovascular: Negative for chest pain and leg swelling  Gastrointestinal: Negative for abdominal pain, constipation and diarrhea  Genitourinary: Negative for dysuria and frequency  Musculoskeletal: Negative for arthralgias and myalgias  Skin: Negative for rash and wound  Neurological: Negative for dizziness, numbness and headaches  Psychiatric/Behavioral: The patient is not nervous/anxious  Objective:      /80   Pulse 80   Temp 97 9 °F (36 6 °C)   Resp 18   Ht 5' 1 5" (1 562 m)   Wt 64 kg (141 lb)   SpO2 98%   BMI 26 21 kg/m²          Physical Exam   Constitutional: She is oriented to person, place, and time  She appears well-developed and well-nourished  HENT:   Head: Normocephalic and atraumatic  Right Ear: Tympanic membrane, external ear and ear canal normal    Left Ear: Tympanic membrane, external ear and ear canal normal    Nose: Nose normal    Eyes: Pupils are equal, round, and reactive to light  Conjunctivae are normal    Neck: Neck supple  Cardiovascular: Normal rate, regular rhythm and normal heart sounds  Pulmonary/Chest: Effort normal and breath sounds normal  She has no wheezes  She has no rales  Abdominal: Soft  Bowel sounds are normal    Musculoskeletal: She exhibits no edema  Neurological: She is alert and oriented to person, place, and time  Skin: Skin is warm  No rash noted  Psychiatric: She has a normal mood and affect  Her behavior is normal    Nursing note and vitals reviewed  Lab results reviewed with patient  BMI Counseling: Body mass index is 26 21 kg/m²  Discussed the patient's BMI with her  The BMI is above average  BMI counseling and education was provided to the patient  Exercise recommendations include strength training exercises

## 2019-09-23 DIAGNOSIS — I10 ESSENTIAL HYPERTENSION: ICD-10-CM

## 2019-09-23 RX ORDER — BISOPROLOL FUMARATE AND HYDROCHLOROTHIAZIDE 2.5; 6.25 MG/1; MG/1
1 TABLET ORAL DAILY
Qty: 90 TABLET | Refills: 1 | Status: SHIPPED | OUTPATIENT
Start: 2019-09-23 | End: 2020-03-12 | Stop reason: SDUPTHER

## 2019-09-23 RX ORDER — SPIRONOLACTONE 25 MG/1
25 TABLET ORAL DAILY
Qty: 90 TABLET | Refills: 1 | Status: SHIPPED | OUTPATIENT
Start: 2019-09-23 | End: 2020-03-12 | Stop reason: SDUPTHER

## 2020-03-12 DIAGNOSIS — I10 ESSENTIAL HYPERTENSION: ICD-10-CM

## 2020-03-12 RX ORDER — SPIRONOLACTONE 25 MG/1
25 TABLET ORAL DAILY
Qty: 90 TABLET | Refills: 1 | Status: SHIPPED | OUTPATIENT
Start: 2020-03-12 | End: 2020-08-06 | Stop reason: SDUPTHER

## 2020-03-12 RX ORDER — BISOPROLOL FUMARATE AND HYDROCHLOROTHIAZIDE 2.5; 6.25 MG/1; MG/1
1 TABLET ORAL DAILY
Qty: 90 TABLET | Refills: 1 | Status: SHIPPED | OUTPATIENT
Start: 2020-03-12 | End: 2020-08-06 | Stop reason: SDUPTHER

## 2020-08-06 ENCOUNTER — OFFICE VISIT (OUTPATIENT)
Dept: INTERNAL MEDICINE CLINIC | Facility: CLINIC | Age: 45
End: 2020-08-06
Payer: COMMERCIAL

## 2020-08-06 VITALS
DIASTOLIC BLOOD PRESSURE: 74 MMHG | WEIGHT: 143.4 LBS | HEART RATE: 55 BPM | OXYGEN SATURATION: 98 % | TEMPERATURE: 98.4 F | SYSTOLIC BLOOD PRESSURE: 126 MMHG | RESPIRATION RATE: 18 BRPM | BODY MASS INDEX: 26.39 KG/M2 | HEIGHT: 62 IN

## 2020-08-06 DIAGNOSIS — R79.89 ABNORMAL THYROID BLOOD TEST: ICD-10-CM

## 2020-08-06 DIAGNOSIS — I10 ESSENTIAL HYPERTENSION: ICD-10-CM

## 2020-08-06 DIAGNOSIS — Z00.00 HEALTH MAINTENANCE EXAMINATION: Primary | ICD-10-CM

## 2020-08-06 DIAGNOSIS — L70.8 OTHER ACNE: ICD-10-CM

## 2020-08-06 DIAGNOSIS — E55.9 VITAMIN D DEFICIENCY: ICD-10-CM

## 2020-08-06 DIAGNOSIS — Z00.00 LABORATORY EXAMINATION ORDERED AS PART OF A ROUTINE GENERAL MEDICAL EXAMINATION: ICD-10-CM

## 2020-08-06 DIAGNOSIS — R68.89 THROAT SYMPTOM: ICD-10-CM

## 2020-08-06 DIAGNOSIS — E78.2 MIXED HYPERLIPIDEMIA: ICD-10-CM

## 2020-08-06 PROCEDURE — 3078F DIAST BP <80 MM HG: CPT | Performed by: INTERNAL MEDICINE

## 2020-08-06 PROCEDURE — 1036F TOBACCO NON-USER: CPT | Performed by: INTERNAL MEDICINE

## 2020-08-06 PROCEDURE — 3008F BODY MASS INDEX DOCD: CPT | Performed by: INTERNAL MEDICINE

## 2020-08-06 PROCEDURE — 3074F SYST BP LT 130 MM HG: CPT | Performed by: INTERNAL MEDICINE

## 2020-08-06 PROCEDURE — 3725F SCREEN DEPRESSION PERFORMED: CPT | Performed by: INTERNAL MEDICINE

## 2020-08-06 PROCEDURE — 99396 PREV VISIT EST AGE 40-64: CPT | Performed by: INTERNAL MEDICINE

## 2020-08-06 RX ORDER — MELATONIN
1000 DAILY
Qty: 90 TABLET | Refills: 0
Start: 2020-08-06

## 2020-08-06 RX ORDER — BISOPROLOL FUMARATE AND HYDROCHLOROTHIAZIDE 2.5; 6.25 MG/1; MG/1
1 TABLET ORAL DAILY
Qty: 90 TABLET | Refills: 0 | Status: SHIPPED | OUTPATIENT
Start: 2020-08-06 | End: 2021-01-30 | Stop reason: SDUPTHER

## 2020-08-06 RX ORDER — MULTIVITAMIN
1 TABLET ORAL DAILY
Qty: 90 TABLET | Refills: 0
Start: 2020-08-06

## 2020-08-06 RX ORDER — SPIRONOLACTONE 50 MG/1
50 TABLET, FILM COATED ORAL DAILY
Qty: 90 TABLET | Refills: 0 | Status: SHIPPED | OUTPATIENT
Start: 2020-08-06 | End: 2020-11-04

## 2020-08-06 NOTE — LETTER
August 6, 2020     Patient: Dolly Haro   YOB: 1975   Date of Visit: 8/6/2020       To Whom it May Concern:    Hermila Nasra is under my professional care  She was seen in my office on 8/6/2020  If you have any questions or concerns, please don't hesitate to call           Sincerely,          Mendel Chase, MD        CC: No Recipients

## 2020-08-06 NOTE — PROGRESS NOTES
Assessment/Plan:    Essential hypertension  BP stable, on bisoprolol-HCTZ  Acne  Increase spironolactone to 50 mg daily, as recommended by dermatology  Allergic rhinitis  Uses nasal spray or takes antihistamine prn, symptoms worse during spring time  Mixed hyperlipidemia  Lipids due, not on medication  Reactive airway disease without complication  Rare use of albuterol inhaler  Vitamin D deficiency  Takes D3 daily  Diagnoses and all orders for this visit:    Health maintenance examination  Comments:  Mammogram scheduled, due for PAPs  Orders:  -     Multiple Vitamin (multivitamin) tablet; Take 1 tablet by mouth daily  -     cholecalciferol (VITAMIN D3) 1,000 units tablet; Take 1 tablet (1,000 Units total) by mouth daily    Throat symptom  Comments:  Differential Dx: GERD, esophageal narrowing, hiatal hernia, PND  Monitor symptoms, cut food into smaller symptoms  Essential hypertension  -     bisoprolol-hydrochlorothiazide (ZIAC) 2 5-6 25 MG per tablet; Take 1 tablet by mouth daily  -     spironolactone (ALDACTONE) 50 mg tablet; Take 1 tablet (50 mg total) by mouth daily  -     CBC and differential; Future  -     Comprehensive metabolic panel; Future  -     Lipid panel; Future  -     TSH, 3rd generation with Free T4 reflex; Future    Abnormal thyroid blood test  -     TSH, 3rd generation with Free T4 reflex; Future    Mixed hyperlipidemia  -     Comprehensive metabolic panel; Future  -     Lipid panel; Future    Vitamin D deficiency  -     Vitamin D 25 hydroxy; Future    Laboratory examination ordered as part of a routine general medical examination  -     CBC and differential; Future  -     Comprehensive metabolic panel; Future  -     Lipid panel; Future  -     TSH, 3rd generation with Free T4 reflex; Future  -     Vitamin D 25 hydroxy; Future    Other acne      Follow up in 1 year or as needed  Subjective:      Patient ID: Freeman Rabago is a 50 y o  female      Thalia Portillo is here with her mother today  She notice she would have occasional left-sided neck discomfort  This would occur with swallowing solid foods, no symptoms when swallowing liquids  She cannot say which foods trigger it  She denies any epigastric pain or discomfort, no abdominal bloating or cramping  She does experience occasional reflux symptoms, would take Tums as needed with good relief  She suffers from allergies mostly during springtime  She has not needed her antihistamine or nasal spray recently  She uses her albuterol inhaler as needed only, used it over 2 months ago  She has been working at home, has been exercising regularly  No plans for her to return to [place] at this time  The following portions of the patient's history were reviewed and updated as appropriate: allergies, current medications, past medical history, past social history and problem list     Review of Systems   Constitutional: Negative for appetite change and fatigue  HENT: Positive for trouble swallowing  Negative for congestion, ear pain, postnasal drip, rhinorrhea, sinus pressure, sinus pain and voice change  Eyes: Negative for visual disturbance  Respiratory: Negative for cough, chest tightness and shortness of breath  Cardiovascular: Negative for chest pain and leg swelling  Gastrointestinal: Negative for abdominal pain, constipation and diarrhea  Genitourinary: Negative for dysuria, frequency and urgency  Musculoskeletal: Negative for arthralgias and myalgias  Skin: Negative for rash and wound  Neurological: Negative for dizziness, numbness and headaches  Hematological: Does not bruise/bleed easily  Psychiatric/Behavioral: Negative for sleep disturbance  The patient is not nervous/anxious            Objective:      /74   Pulse 55   Temp 98 4 °F (36 9 °C)   Resp 18   Ht 5' 1 5" (1 562 m)   Wt 65 kg (143 lb 6 4 oz)   SpO2 98%   BMI 26 66 kg/m²          Physical Exam   Constitutional: She is oriented to person, place, and time  She appears well-developed  HENT:   Head: Normocephalic and atraumatic  Right Ear: Tympanic membrane, external ear and ear canal normal    Left Ear: Tympanic membrane, external ear and ear canal normal    Eyes: Pupils are equal, round, and reactive to light  Neck: Neck supple  No thyroid mass present  Cardiovascular: Normal rate, regular rhythm and normal heart sounds  Pulmonary/Chest: Effort normal and breath sounds normal  She has no wheezes  Abdominal: Soft  Bowel sounds are normal    Lymphadenopathy:     She has no cervical adenopathy  Right cervical: No superficial cervical, no deep cervical and no posterior cervical adenopathy present  Left cervical: No superficial cervical, no deep cervical and no posterior cervical adenopathy present  Neurological: She is alert and oriented to person, place, and time  Skin: Skin is warm  No rash noted  Psychiatric: Her behavior is normal    Nursing note and vitals reviewed  Labs & imaging results reviewed with patient  BMI Counseling: Body mass index is 26 66 kg/m²  The BMI is above normal  Exercise recommendations include moderate aerobic physical activity for 150 minutes/week and strength training exercises

## 2020-08-29 ENCOUNTER — LAB (OUTPATIENT)
Dept: LAB | Age: 45
End: 2020-08-29
Payer: COMMERCIAL

## 2020-08-29 DIAGNOSIS — I10 ESSENTIAL HYPERTENSION: ICD-10-CM

## 2020-08-29 DIAGNOSIS — E55.9 VITAMIN D DEFICIENCY: ICD-10-CM

## 2020-08-29 DIAGNOSIS — E78.2 MIXED HYPERLIPIDEMIA: ICD-10-CM

## 2020-08-29 DIAGNOSIS — Z00.00 LABORATORY EXAMINATION ORDERED AS PART OF A ROUTINE GENERAL MEDICAL EXAMINATION: ICD-10-CM

## 2020-08-29 DIAGNOSIS — R79.89 ABNORMAL THYROID BLOOD TEST: ICD-10-CM

## 2020-08-29 LAB
25(OH)D3 SERPL-MCNC: 49 NG/ML (ref 30–100)
ALBUMIN SERPL BCP-MCNC: 3.6 G/DL (ref 3.5–5)
ALP SERPL-CCNC: 48 U/L (ref 46–116)
ALT SERPL W P-5'-P-CCNC: 20 U/L (ref 12–78)
ANION GAP SERPL CALCULATED.3IONS-SCNC: 4 MMOL/L (ref 4–13)
AST SERPL W P-5'-P-CCNC: 16 U/L (ref 5–45)
BASOPHILS # BLD AUTO: 0.05 THOUSANDS/ΜL (ref 0–0.1)
BASOPHILS NFR BLD AUTO: 1 % (ref 0–1)
BILIRUB SERPL-MCNC: 1.18 MG/DL (ref 0.2–1)
BUN SERPL-MCNC: 12 MG/DL (ref 5–25)
CALCIUM SERPL-MCNC: 8.8 MG/DL (ref 8.3–10.1)
CHLORIDE SERPL-SCNC: 107 MMOL/L (ref 100–108)
CHOLEST SERPL-MCNC: 183 MG/DL (ref 50–200)
CO2 SERPL-SCNC: 28 MMOL/L (ref 21–32)
CREAT SERPL-MCNC: 0.87 MG/DL (ref 0.6–1.3)
EOSINOPHIL # BLD AUTO: 0.13 THOUSAND/ΜL (ref 0–0.61)
EOSINOPHIL NFR BLD AUTO: 2 % (ref 0–6)
ERYTHROCYTE [DISTWIDTH] IN BLOOD BY AUTOMATED COUNT: 12.4 % (ref 11.6–15.1)
GFR SERPL CREATININE-BSD FRML MDRD: 80 ML/MIN/1.73SQ M
GLUCOSE P FAST SERPL-MCNC: 80 MG/DL (ref 65–99)
HCT VFR BLD AUTO: 42.5 % (ref 34.8–46.1)
HDLC SERPL-MCNC: 33 MG/DL
HGB BLD-MCNC: 14.6 G/DL (ref 11.5–15.4)
IMM GRANULOCYTES # BLD AUTO: 0.01 THOUSAND/UL (ref 0–0.2)
IMM GRANULOCYTES NFR BLD AUTO: 0 % (ref 0–2)
LDLC SERPL CALC-MCNC: 108 MG/DL (ref 0–100)
LYMPHOCYTES # BLD AUTO: 1.92 THOUSANDS/ΜL (ref 0.6–4.47)
LYMPHOCYTES NFR BLD AUTO: 31 % (ref 14–44)
MCH RBC QN AUTO: 30.1 PG (ref 26.8–34.3)
MCHC RBC AUTO-ENTMCNC: 34.4 G/DL (ref 31.4–37.4)
MCV RBC AUTO: 88 FL (ref 82–98)
MONOCYTES # BLD AUTO: 0.44 THOUSAND/ΜL (ref 0.17–1.22)
MONOCYTES NFR BLD AUTO: 7 % (ref 4–12)
NEUTROPHILS # BLD AUTO: 3.69 THOUSANDS/ΜL (ref 1.85–7.62)
NEUTS SEG NFR BLD AUTO: 59 % (ref 43–75)
NONHDLC SERPL-MCNC: 150 MG/DL
NRBC BLD AUTO-RTO: 0 /100 WBCS
PLATELET # BLD AUTO: 214 THOUSANDS/UL (ref 149–390)
PMV BLD AUTO: 10.4 FL (ref 8.9–12.7)
POTASSIUM SERPL-SCNC: 4.3 MMOL/L (ref 3.5–5.3)
PROT SERPL-MCNC: 7.1 G/DL (ref 6.4–8.2)
RBC # BLD AUTO: 4.85 MILLION/UL (ref 3.81–5.12)
SODIUM SERPL-SCNC: 139 MMOL/L (ref 136–145)
T4 FREE SERPL-MCNC: 0.8 NG/DL (ref 0.76–1.46)
TRIGL SERPL-MCNC: 210 MG/DL
TSH SERPL DL<=0.05 MIU/L-ACNC: 3.89 UIU/ML (ref 0.36–3.74)
WBC # BLD AUTO: 6.24 THOUSAND/UL (ref 4.31–10.16)

## 2020-08-29 PROCEDURE — 85025 COMPLETE CBC W/AUTO DIFF WBC: CPT

## 2020-08-29 PROCEDURE — 36415 COLL VENOUS BLD VENIPUNCTURE: CPT

## 2020-08-29 PROCEDURE — 80061 LIPID PANEL: CPT

## 2020-08-29 PROCEDURE — 80053 COMPREHEN METABOLIC PANEL: CPT

## 2020-08-29 PROCEDURE — 82306 VITAMIN D 25 HYDROXY: CPT

## 2020-08-29 PROCEDURE — 84439 ASSAY OF FREE THYROXINE: CPT

## 2020-08-29 PROCEDURE — 84443 ASSAY THYROID STIM HORMONE: CPT

## 2020-08-31 NOTE — RESULT ENCOUNTER NOTE
Lab results: cholesterol slightly better, still elevated  Thyroid test slightly abnormal, since feeling well, will just monitor this    The rest of your labs werer normal

## 2020-10-01 ENCOUNTER — CLINICAL SUPPORT (OUTPATIENT)
Dept: INTERNAL MEDICINE CLINIC | Facility: CLINIC | Age: 45
End: 2020-10-01
Payer: COMMERCIAL

## 2020-10-01 DIAGNOSIS — Z23 NEED FOR INFLUENZA VACCINATION: Primary | ICD-10-CM

## 2020-10-01 PROCEDURE — 90686 IIV4 VACC NO PRSV 0.5 ML IM: CPT | Performed by: INTERNAL MEDICINE

## 2020-10-01 PROCEDURE — 90471 IMMUNIZATION ADMIN: CPT | Performed by: INTERNAL MEDICINE

## 2020-11-04 DIAGNOSIS — I10 ESSENTIAL HYPERTENSION: ICD-10-CM

## 2020-11-04 RX ORDER — SPIRONOLACTONE 50 MG/1
TABLET, FILM COATED ORAL
Qty: 90 TABLET | Refills: 1 | Status: SHIPPED | OUTPATIENT
Start: 2020-11-04 | End: 2021-04-14 | Stop reason: SDUPTHER

## 2021-01-13 ENCOUNTER — TELEMEDICINE (OUTPATIENT)
Dept: INTERNAL MEDICINE CLINIC | Facility: CLINIC | Age: 46
End: 2021-01-13
Payer: COMMERCIAL

## 2021-01-13 DIAGNOSIS — B34.9 VIRAL INFECTION, UNSPECIFIED: Primary | ICD-10-CM

## 2021-01-13 DIAGNOSIS — J45.20 MILD INTERMITTENT REACTIVE AIRWAY DISEASE WITHOUT COMPLICATION: ICD-10-CM

## 2021-01-13 PROCEDURE — 99213 OFFICE O/P EST LOW 20 MIN: CPT | Performed by: INTERNAL MEDICINE

## 2021-01-13 PROCEDURE — 3725F SCREEN DEPRESSION PERFORMED: CPT | Performed by: INTERNAL MEDICINE

## 2021-01-13 RX ORDER — ALBUTEROL SULFATE 90 UG/1
1-2 AEROSOL, METERED RESPIRATORY (INHALATION) EVERY 6 HOURS PRN
Qty: 8 G | Refills: 1 | Status: SHIPPED | OUTPATIENT
Start: 2021-01-13 | End: 2021-12-10 | Stop reason: SDUPTHER

## 2021-01-13 NOTE — PROGRESS NOTES
COVID-19 Virtual Visit     Assessment/Plan:    Problem List Items Addressed This Visit        Respiratory    Reactive airway disease without complication     Using albuterol inhaler as needed  Other Visit Diagnoses     Viral infection, unspecified    -  Primary    Relevant Orders    Novel Coronavirus (COVID-19), PCR [facility] - Collected at   [facility]         Disposition:     I referred patient to one of our centralized sites for a COVID-19 swab  Instructed to self quarantine until results available  Monitor for fevers  May use inhaler prn  I have spent 5 minutes directly with the patient  Greater than 50% of this time was spent in counseling/coordination of care regarding: risks and benefits of treatment options, instructions for management, patient and family education and impressions  Encounter provider Charbel Levin MD    Provider located at [address] [place] [id number]    Recent Visits  No visits were found meeting these conditions  Showing recent visits within past 7 days and meeting all other requirements     Today's Visits  Date Type Provider Dept   01/13/21 Telemedicine Charbel Levin, [facility]   Showing today's visits and meeting all other requirements     Future Appointments  No visits were found meeting these conditions  Showing future appointments within next 150 days and meeting all other requirements      This virtual check-in was done via [employer] and patient was informed that this is a secure, HIPAA-compliant platform  She agrees to proceed  Patient agrees to participate in a virtual check in via telephone or video visit instead of presenting to the office to address urgent/immediate medical needs  Patient is aware this is a billable service  After connecting through [facility], the patient was identified by name and date of birth   Yamila Abreu Janeth Molina was informed that this was a telemedicine visit and that the exam was being conducted confidentially over secure lines  My office door was closed  No one else was in the room  Marilou Banuelos acknowledged consent and understanding of privacy and security of the telemedicine visit  I informed the patient that I have reviewed her record in Epic and presented the opportunity for her to ask any questions regarding the visit today  The patient agreed to participate  Subjective:   Marilou Banuelos is a 50 y o  female who is concerned about COVID-19  Patient's symptoms include fever, nasal congestion, rhinorrhea, cough and headache  Patient denies chills, sore throat, anosmia, loss of taste, shortness of breath, diarrhea and myalgias  Date of symptom onset: 1/12/2021    Exposure:   Contact with a person who is under investigation (PUI) for or who is positive for COVID-19 within the last 14 days?: No    Hospitalized recently for fever and/or lower respiratory symptoms?: No      Currently a healthcare worker that is involved in direct patient care?: No      Works in a special setting where the risk of COVID-19 transmission may be high? (this may include long-term care, correctional and retirement facilities; homeless shelters; assisted-living facilities and group homes ): No      Resident in a special setting where the risk of COVID-19 transmission may be high? (this may include long-term care, correctional and retirement facilities; homeless shelters; assisted-living facilities and group homes ): No      She reports episodes of sneezing yesterday with a low grade fever of 99 2  She has some post nasal drip, occasional non productive cough  She used her ProAir which helped  She has an occasional headache  She is worried about COVID since she lives with her elderly mother      No results found for: Elizabeth Hernández, [address] [facility], [place] [address]  Past Medical History:   Diagnosis Date    Abnormal mammography     last assessed - [id number]    Anxiety     was on xanax    Hypertension     Supraspinatus tendonitis, right      Past Surgical History:   Procedure Laterality Date    REDUCTION MAMMAPLASTY       Current Outpatient Medications   Medication Sig Dispense Refill    albuterol (ProAir HFA) 90 mcg/act inhaler Inhale 1-2 puffs every 6 (six) hours as needed for wheezing 8 g 1    bisoprolol-hydrochlorothiazide (ZIAC) 2 5-6 25 MG per tablet Take 1 tablet by mouth daily 90 tablet 0    Calcium Carb-Cholecalciferol 1000-800 MG-UNIT TABS Take 1 tablet by mouth daily      cholecalciferol (VITAMIN D3) 1,000 units tablet Take 1 tablet (1,000 Units total) by mouth daily 90 tablet 0    Multiple Vitamin (multivitamin) tablet Take 1 tablet by mouth daily 90 tablet 0    spironolactone (ALDACTONE) 50 mg tablet Take 1 tablet by mouth once daily 90 tablet 1     No current facility-administered medications for this visit  No Known Allergies    Review of Systems   Constitutional: Positive for fever  Negative for activity change, appetite change and chills  HENT: Positive for congestion and rhinorrhea  Negative for sore throat  Respiratory: Positive for cough  Negative for shortness of breath  Gastrointestinal: Negative for diarrhea and rectal pain  Musculoskeletal: Negative for myalgias  Neurological: Positive for headaches  Objective: There were no vitals filed for this visit  Physical Exam  Constitutional:       General: She is not in acute distress  Appearance: Normal appearance  She is not ill-appearing  HENT:      Head: Normocephalic and atraumatic  Eyes:      Pupils: Pupils are equal, round, and reactive to light  Pulmonary:      Effort: Pulmonary effort is normal  No respiratory distress  Neurological:      General: No focal deficit present  Mental Status: She is alert and oriented to person, place, and time     Psychiatric:         Mood and Affect: Mood normal  Behavior: Behavior normal        VIRTUAL VISIT DISCLAIMER    Florence Castillo acknowledges that she has consented to an online visit or consultation  She understands that the online visit is based solely on information provided by her, and that, in the absence of a face-to-face physical evaluation by the physician, the diagnosis she receives is both limited and provisional in terms of accuracy and completeness  This is not intended to replace a full medical face-to-face evaluation by the physician  Florence Castillo understands and accepts these terms

## 2021-01-15 DIAGNOSIS — B34.9 VIRAL INFECTION, UNSPECIFIED: ICD-10-CM

## 2021-01-15 PROCEDURE — 87635 SARS-COV-2 COVID-19 AMP PRB: CPT

## 2021-01-16 LAB — SARS-COV-2 RNA RESP QL NAA+PROBE: NEGATIVE

## 2021-01-30 DIAGNOSIS — I10 ESSENTIAL HYPERTENSION: ICD-10-CM

## 2021-02-02 DIAGNOSIS — I10 ESSENTIAL HYPERTENSION: ICD-10-CM

## 2021-02-03 RX ORDER — BISOPROLOL FUMARATE AND HYDROCHLOROTHIAZIDE 2.5; 6.25 MG/1; MG/1
1 TABLET ORAL DAILY
Qty: 90 TABLET | Refills: 0 | OUTPATIENT
Start: 2021-02-03

## 2021-02-03 RX ORDER — BISOPROLOL FUMARATE AND HYDROCHLOROTHIAZIDE 2.5; 6.25 MG/1; MG/1
1 TABLET ORAL DAILY
Qty: 90 TABLET | Refills: 1 | Status: SHIPPED | OUTPATIENT
Start: 2021-02-03 | End: 2021-08-09 | Stop reason: SDUPTHER

## 2021-03-10 DIAGNOSIS — Z23 ENCOUNTER FOR IMMUNIZATION: ICD-10-CM

## 2021-04-14 DIAGNOSIS — I10 ESSENTIAL HYPERTENSION: ICD-10-CM

## 2021-04-15 RX ORDER — SPIRONOLACTONE 50 MG/1
50 TABLET, FILM COATED ORAL DAILY
Qty: 90 TABLET | Refills: 1 | Status: SHIPPED | OUTPATIENT
Start: 2021-04-15 | End: 2021-08-09 | Stop reason: SDUPTHER

## 2021-08-09 ENCOUNTER — OFFICE VISIT (OUTPATIENT)
Dept: INTERNAL MEDICINE CLINIC | Facility: CLINIC | Age: 46
End: 2021-08-09
Payer: COMMERCIAL

## 2021-08-09 VITALS
HEIGHT: 66 IN | BODY MASS INDEX: 23.33 KG/M2 | OXYGEN SATURATION: 99 % | SYSTOLIC BLOOD PRESSURE: 128 MMHG | DIASTOLIC BLOOD PRESSURE: 80 MMHG | WEIGHT: 145.2 LBS | HEART RATE: 76 BPM | TEMPERATURE: 97.5 F

## 2021-08-09 DIAGNOSIS — Z56.6 STRESS AT WORK: ICD-10-CM

## 2021-08-09 DIAGNOSIS — Z23 NEED FOR TDAP VACCINATION: ICD-10-CM

## 2021-08-09 DIAGNOSIS — Z00.00 HEALTH MAINTENANCE EXAMINATION: Primary | ICD-10-CM

## 2021-08-09 DIAGNOSIS — E55.9 VITAMIN D DEFICIENCY: ICD-10-CM

## 2021-08-09 DIAGNOSIS — Z79.899 ENCOUNTER FOR LONG-TERM CURRENT USE OF MEDICATION: ICD-10-CM

## 2021-08-09 DIAGNOSIS — E78.2 MIXED HYPERLIPIDEMIA: ICD-10-CM

## 2021-08-09 DIAGNOSIS — L70.8 OTHER ACNE: ICD-10-CM

## 2021-08-09 DIAGNOSIS — I10 ESSENTIAL HYPERTENSION: ICD-10-CM

## 2021-08-09 DIAGNOSIS — Z00.00 LABORATORY EXAMINATION ORDERED AS PART OF A ROUTINE GENERAL MEDICAL EXAMINATION: ICD-10-CM

## 2021-08-09 PROCEDURE — 90715 TDAP VACCINE 7 YRS/> IM: CPT | Performed by: INTERNAL MEDICINE

## 2021-08-09 PROCEDURE — 3079F DIAST BP 80-89 MM HG: CPT | Performed by: INTERNAL MEDICINE

## 2021-08-09 PROCEDURE — 1036F TOBACCO NON-USER: CPT | Performed by: INTERNAL MEDICINE

## 2021-08-09 PROCEDURE — 99396 PREV VISIT EST AGE 40-64: CPT | Performed by: INTERNAL MEDICINE

## 2021-08-09 PROCEDURE — 3008F BODY MASS INDEX DOCD: CPT | Performed by: INTERNAL MEDICINE

## 2021-08-09 PROCEDURE — 90471 IMMUNIZATION ADMIN: CPT | Performed by: INTERNAL MEDICINE

## 2021-08-09 PROCEDURE — 3074F SYST BP LT 130 MM HG: CPT | Performed by: INTERNAL MEDICINE

## 2021-08-09 RX ORDER — SPIRONOLACTONE 50 MG/1
50 TABLET, FILM COATED ORAL DAILY
Qty: 90 TABLET | Refills: 1 | Status: SHIPPED | OUTPATIENT
Start: 2021-08-09 | End: 2022-02-01 | Stop reason: SDUPTHER

## 2021-08-09 RX ORDER — BISOPROLOL FUMARATE AND HYDROCHLOROTHIAZIDE 2.5; 6.25 MG/1; MG/1
1 TABLET ORAL DAILY
Qty: 90 TABLET | Refills: 1 | Status: SHIPPED | OUTPATIENT
Start: 2021-08-09 | End: 2022-02-01 | Stop reason: SDUPTHER

## 2021-08-09 SDOH — HEALTH STABILITY - MENTAL HEALTH: OTHER PHYSICAL AND MENTAL STRAIN RELATED TO WORK: Z56.6

## 2021-08-09 NOTE — PATIENT INSTRUCTIONS
Add breaks in your schedule in the morning and afternoon  Exercise daily  Look into meditation, yoga

## 2021-08-09 NOTE — PROGRESS NOTES
Assessment/Plan:    Stress at work  Discussed relaxation techniques, meditation  Essential hypertension  BP controlled, on bisoprolol-HCTZ  Acne  On spironolactone  Allergic rhinitis  Minimal symptoms  Reactive airway disease without complication  No recent symptoms, has not needed albuterol inhaler  Diagnoses and all orders for this visit:    Health maintenance examination  Comments:  Received COVID vaccine  Mammogram, PAPs scheduled  Essential hypertension  -     spironolactone (ALDACTONE) 50 mg tablet; Take 1 tablet (50 mg total) by mouth daily  -     bisoprolol-hydrochlorothiazide (ZIAC) 2 5-6 25 MG per tablet; Take 1 tablet by mouth daily  -     CBC and differential  -     TSH, 3rd generation with Free T4 reflex    Stress at work    Other acne    Mixed hyperlipidemia  -     Comprehensive metabolic panel  -     Lipid panel    Laboratory examination ordered as part of a routine general medical examination  -     CBC and differential  -     Comprehensive metabolic panel  -     Lipid panel  -     TSH, 3rd generation with Free T4 reflex  -     Vitamin D 25 hydroxy  -     Vitamin B12    Encounter for long-term current use of medication  -     Vitamin B12    Vitamin D deficiency  -     Vitamin D 25 hydroxy    Need for Tdap vaccination  -     TDAP VACCINE GREATER THAN OR EQUAL TO 8YO IM    Other orders  -     Biotin 5000 MCG CAPS      Follow up in 1 year or as needed  Subjective:      Patient ID: Sharlene Paul is a 48 y o  female here for a physical     She reports feeling very stressed, working from home  She has been working long hours with limited breaks  She has anxiety and palpitations occasionally, no issues with sleep  She is trying to take more breaks and exercise during the day  Denies any headache or dizziness, no chest pain or palpitations      The following portions of the patient's history were reviewed and updated as appropriate: allergies, current medications, past medical history, past social history and problem list     Review of Systems   Constitutional: Negative for activity change, appetite change and fatigue  HENT: Negative for congestion, ear pain and postnasal drip  Eyes: Negative for visual disturbance  Respiratory: Negative for cough and shortness of breath  Cardiovascular: Negative for chest pain and leg swelling  Gastrointestinal: Negative for abdominal pain, constipation and diarrhea  Genitourinary: Negative for dysuria, frequency, pelvic pain, urgency and vaginal pain  Musculoskeletal: Negative for arthralgias and myalgias  Skin: Negative for rash and wound  Neurological: Negative for dizziness, numbness and headaches  Psychiatric/Behavioral: Negative for agitation, confusion and sleep disturbance  The patient is nervous/anxious  Objective:      /80   Pulse 76   Temp 97 5 °F (36 4 °C)   Ht 5' 6" (1 676 m)   Wt 65 9 kg (145 lb 3 2 oz)   SpO2 99%   BMI 23 44 kg/m²          Physical Exam  Vitals and nursing note reviewed  Constitutional:       General: She is not in acute distress  Appearance: She is well-developed  HENT:      Head: Normocephalic and atraumatic  Right Ear: Tympanic membrane, ear canal and external ear normal       Left Ear: Tympanic membrane, ear canal and external ear normal    Eyes:      Pupils: Pupils are equal, round, and reactive to light  Cardiovascular:      Rate and Rhythm: Normal rate and regular rhythm  Heart sounds: Normal heart sounds  Pulmonary:      Effort: Pulmonary effort is normal       Breath sounds: Normal breath sounds  No wheezing  Abdominal:      General: Bowel sounds are normal       Palpations: Abdomen is soft  Musculoskeletal:         General: No swelling  Right lower leg: No edema  Left lower leg: No edema  Skin:     General: Skin is warm  Findings: No rash  Neurological:      General: No focal deficit present        Mental Status: She is alert and oriented to person, place, and time  Psychiatric:         Mood and Affect: Mood normal          Behavior: Behavior normal            Labs & imaging results reviewed with patient

## 2021-08-24 ENCOUNTER — APPOINTMENT (OUTPATIENT)
Dept: LAB | Age: 46
End: 2021-08-24
Payer: COMMERCIAL

## 2021-08-24 ENCOUNTER — TELEPHONE (OUTPATIENT)
Dept: INTERNAL MEDICINE CLINIC | Facility: CLINIC | Age: 46
End: 2021-08-24

## 2021-08-24 DIAGNOSIS — E03.9 ACQUIRED HYPOTHYROIDISM: Primary | ICD-10-CM

## 2021-08-24 LAB
25(OH)D3 SERPL-MCNC: 52.3 NG/ML (ref 30–100)
ALBUMIN SERPL BCP-MCNC: 3.5 G/DL (ref 3.5–5)
ALP SERPL-CCNC: 51 U/L (ref 46–116)
ALT SERPL W P-5'-P-CCNC: 30 U/L (ref 12–78)
ANION GAP SERPL CALCULATED.3IONS-SCNC: 3 MMOL/L (ref 4–13)
AST SERPL W P-5'-P-CCNC: 17 U/L (ref 5–45)
BASOPHILS # BLD AUTO: 0.06 THOUSANDS/ΜL (ref 0–0.1)
BASOPHILS NFR BLD AUTO: 1 % (ref 0–1)
BILIRUB SERPL-MCNC: 0.65 MG/DL (ref 0.2–1)
BUN SERPL-MCNC: 10 MG/DL (ref 5–25)
CALCIUM SERPL-MCNC: 9.1 MG/DL (ref 8.3–10.1)
CHLORIDE SERPL-SCNC: 105 MMOL/L (ref 100–108)
CHOLEST SERPL-MCNC: 199 MG/DL (ref 50–200)
CO2 SERPL-SCNC: 29 MMOL/L (ref 21–32)
CREAT SERPL-MCNC: 0.8 MG/DL (ref 0.6–1.3)
EOSINOPHIL # BLD AUTO: 0.17 THOUSAND/ΜL (ref 0–0.61)
EOSINOPHIL NFR BLD AUTO: 2 % (ref 0–6)
ERYTHROCYTE [DISTWIDTH] IN BLOOD BY AUTOMATED COUNT: 12.6 % (ref 11.6–15.1)
GFR SERPL CREATININE-BSD FRML MDRD: 87 ML/MIN/1.73SQ M
GLUCOSE P FAST SERPL-MCNC: 85 MG/DL (ref 65–99)
HCT VFR BLD AUTO: 42 % (ref 34.8–46.1)
HDLC SERPL-MCNC: 27 MG/DL
HGB BLD-MCNC: 14.5 G/DL (ref 11.5–15.4)
IMM GRANULOCYTES # BLD AUTO: 0.03 THOUSAND/UL (ref 0–0.2)
IMM GRANULOCYTES NFR BLD AUTO: 0 % (ref 0–2)
LDLC SERPL CALC-MCNC: 100 MG/DL (ref 0–100)
LYMPHOCYTES # BLD AUTO: 2.57 THOUSANDS/ΜL (ref 0.6–4.47)
LYMPHOCYTES NFR BLD AUTO: 31 % (ref 14–44)
MCH RBC QN AUTO: 30.9 PG (ref 26.8–34.3)
MCHC RBC AUTO-ENTMCNC: 34.5 G/DL (ref 31.4–37.4)
MCV RBC AUTO: 89 FL (ref 82–98)
MONOCYTES # BLD AUTO: 0.63 THOUSAND/ΜL (ref 0.17–1.22)
MONOCYTES NFR BLD AUTO: 8 % (ref 4–12)
NEUTROPHILS # BLD AUTO: 4.94 THOUSANDS/ΜL (ref 1.85–7.62)
NEUTS SEG NFR BLD AUTO: 58 % (ref 43–75)
NONHDLC SERPL-MCNC: 172 MG/DL
NRBC BLD AUTO-RTO: 0 /100 WBCS
PLATELET # BLD AUTO: 243 THOUSANDS/UL (ref 149–390)
PMV BLD AUTO: 10.4 FL (ref 8.9–12.7)
POTASSIUM SERPL-SCNC: 4.4 MMOL/L (ref 3.5–5.3)
PROT SERPL-MCNC: 7.1 G/DL (ref 6.4–8.2)
RBC # BLD AUTO: 4.7 MILLION/UL (ref 3.81–5.12)
SODIUM SERPL-SCNC: 137 MMOL/L (ref 136–145)
T4 FREE SERPL-MCNC: 0.73 NG/DL (ref 0.76–1.46)
TRIGL SERPL-MCNC: 362 MG/DL
TSH SERPL DL<=0.05 MIU/L-ACNC: 5.75 UIU/ML (ref 0.36–3.74)
VIT B12 SERPL-MCNC: 847 PG/ML (ref 100–900)
WBC # BLD AUTO: 8.4 THOUSAND/UL (ref 4.31–10.16)

## 2021-08-24 PROCEDURE — 84439 ASSAY OF FREE THYROXINE: CPT | Performed by: INTERNAL MEDICINE

## 2021-08-24 PROCEDURE — 80061 LIPID PANEL: CPT | Performed by: INTERNAL MEDICINE

## 2021-08-24 PROCEDURE — 82306 VITAMIN D 25 HYDROXY: CPT | Performed by: INTERNAL MEDICINE

## 2021-08-24 PROCEDURE — 84443 ASSAY THYROID STIM HORMONE: CPT | Performed by: INTERNAL MEDICINE

## 2021-08-24 PROCEDURE — 36415 COLL VENOUS BLD VENIPUNCTURE: CPT | Performed by: INTERNAL MEDICINE

## 2021-08-24 PROCEDURE — 80053 COMPREHEN METABOLIC PANEL: CPT | Performed by: INTERNAL MEDICINE

## 2021-08-24 PROCEDURE — 85025 COMPLETE CBC W/AUTO DIFF WBC: CPT | Performed by: INTERNAL MEDICINE

## 2021-08-24 PROCEDURE — 82607 VITAMIN B-12: CPT | Performed by: INTERNAL MEDICINE

## 2021-08-24 NOTE — TELEPHONE ENCOUNTER
Lab results: Your thyroid test remains abnormal, recommend to start medication  If not, I can recheck in a month  Your cholesterol (triglycerides) are much higher  If you are already debbie low fat diet, you can start fish oil capsules, 1-2 a day      The rest of your labs were normal

## 2021-08-24 NOTE — TELEPHONE ENCOUNTER
Patient says she will start the thyroid medication just wants generic version if possible and what dose will she be taking  I told her I can call her back to inform

## 2021-08-25 PROBLEM — E03.9 ACQUIRED HYPOTHYROIDISM: Status: ACTIVE | Noted: 2021-08-25

## 2021-08-25 RX ORDER — LEVOTHYROXINE SODIUM 0.03 MG/1
25 TABLET ORAL DAILY
Qty: 90 TABLET | Refills: 0 | Status: SHIPPED | OUTPATIENT
Start: 2021-08-25 | End: 2021-11-05 | Stop reason: SDUPTHER

## 2021-08-25 NOTE — TELEPHONE ENCOUNTER
I will start with the lowest dose  Please take your thyroid medication first thing in the morning, on an empty stomach  All other medications at least 30 minutes later  Recheck labs in a few weeks  Ordered, printed  Please mail to patient

## 2021-10-08 ENCOUNTER — LAB (OUTPATIENT)
Dept: LAB | Age: 46
End: 2021-10-08
Payer: COMMERCIAL

## 2021-10-08 DIAGNOSIS — E03.9 ACQUIRED HYPOTHYROIDISM: ICD-10-CM

## 2021-10-08 LAB
T4 FREE SERPL-MCNC: 0.95 NG/DL (ref 0.76–1.46)
TSH SERPL DL<=0.05 MIU/L-ACNC: 3.83 UIU/ML (ref 0.36–3.74)

## 2021-10-08 PROCEDURE — 84443 ASSAY THYROID STIM HORMONE: CPT

## 2021-10-08 PROCEDURE — 84439 ASSAY OF FREE THYROXINE: CPT

## 2021-10-08 PROCEDURE — 36415 COLL VENOUS BLD VENIPUNCTURE: CPT

## 2021-10-13 ENCOUNTER — CLINICAL SUPPORT (OUTPATIENT)
Dept: INTERNAL MEDICINE CLINIC | Facility: CLINIC | Age: 46
End: 2021-10-13
Payer: COMMERCIAL

## 2021-10-13 VITALS — TEMPERATURE: 98.3 F

## 2021-10-13 DIAGNOSIS — Z23 NEED FOR IMMUNIZATION AGAINST INFLUENZA: Primary | ICD-10-CM

## 2021-10-13 PROCEDURE — 90471 IMMUNIZATION ADMIN: CPT

## 2021-10-13 PROCEDURE — 90686 IIV4 VACC NO PRSV 0.5 ML IM: CPT

## 2021-11-04 ENCOUNTER — PATIENT MESSAGE (OUTPATIENT)
Dept: INTERNAL MEDICINE CLINIC | Facility: CLINIC | Age: 46
End: 2021-11-04

## 2021-11-04 DIAGNOSIS — E03.9 ACQUIRED HYPOTHYROIDISM: ICD-10-CM

## 2021-11-05 RX ORDER — LEVOTHYROXINE SODIUM 0.03 MG/1
25 TABLET ORAL DAILY
Qty: 90 TABLET | Refills: 0 | Status: SHIPPED | OUTPATIENT
Start: 2021-11-05 | End: 2022-02-02 | Stop reason: SDUPTHER

## 2021-12-07 DIAGNOSIS — J45.20 MILD INTERMITTENT REACTIVE AIRWAY DISEASE WITHOUT COMPLICATION: Primary | ICD-10-CM

## 2021-12-07 RX ORDER — FLUTICASONE FUROATE AND VILANTEROL TRIFENATATE 100; 25 UG/1; UG/1
1 POWDER RESPIRATORY (INHALATION) DAILY
Qty: 60 BLISTER | Refills: 0 | Status: SHIPPED | OUTPATIENT
Start: 2021-12-07 | End: 2022-01-06

## 2021-12-10 RX ORDER — ALBUTEROL SULFATE 90 UG/1
1-2 AEROSOL, METERED RESPIRATORY (INHALATION) EVERY 6 HOURS PRN
Qty: 8 G | Refills: 1 | Status: SHIPPED | OUTPATIENT
Start: 2021-12-10

## 2022-02-01 DIAGNOSIS — I10 ESSENTIAL HYPERTENSION: ICD-10-CM

## 2022-02-02 DIAGNOSIS — E03.9 ACQUIRED HYPOTHYROIDISM: ICD-10-CM

## 2022-02-02 RX ORDER — BISOPROLOL FUMARATE AND HYDROCHLOROTHIAZIDE 2.5; 6.25 MG/1; MG/1
1 TABLET ORAL DAILY
Qty: 90 TABLET | Refills: 0 | Status: SHIPPED | OUTPATIENT
Start: 2022-02-02 | End: 2022-05-03 | Stop reason: SDUPTHER

## 2022-02-02 RX ORDER — LEVOTHYROXINE SODIUM 0.03 MG/1
25 TABLET ORAL DAILY
Qty: 90 TABLET | Refills: 1 | Status: SHIPPED | OUTPATIENT
Start: 2022-02-02 | End: 2022-07-28

## 2022-02-02 RX ORDER — SPIRONOLACTONE 50 MG/1
50 TABLET, FILM COATED ORAL DAILY
Qty: 90 TABLET | Refills: 0 | Status: SHIPPED | OUTPATIENT
Start: 2022-02-02 | End: 2022-05-03 | Stop reason: SDUPTHER

## 2022-05-03 DIAGNOSIS — I10 ESSENTIAL HYPERTENSION: ICD-10-CM

## 2022-05-03 RX ORDER — BISOPROLOL FUMARATE AND HYDROCHLOROTHIAZIDE 2.5; 6.25 MG/1; MG/1
1 TABLET ORAL DAILY
Qty: 90 TABLET | Refills: 0 | Status: SHIPPED | OUTPATIENT
Start: 2022-05-03 | End: 2022-07-28

## 2022-05-03 RX ORDER — SPIRONOLACTONE 50 MG/1
50 TABLET, FILM COATED ORAL DAILY
Qty: 90 TABLET | Refills: 0 | Status: SHIPPED | OUTPATIENT
Start: 2022-05-03 | End: 2022-07-28

## 2022-07-28 DIAGNOSIS — I10 ESSENTIAL HYPERTENSION: ICD-10-CM

## 2022-07-28 DIAGNOSIS — E03.9 ACQUIRED HYPOTHYROIDISM: ICD-10-CM

## 2022-07-28 RX ORDER — SPIRONOLACTONE 50 MG/1
TABLET, FILM COATED ORAL
Qty: 90 TABLET | Refills: 0 | Status: SHIPPED | OUTPATIENT
Start: 2022-07-28

## 2022-07-28 RX ORDER — LEVOTHYROXINE SODIUM 0.03 MG/1
TABLET ORAL
Qty: 90 TABLET | Refills: 0 | Status: SHIPPED | OUTPATIENT
Start: 2022-07-28

## 2022-07-28 RX ORDER — BISOPROLOL FUMARATE AND HYDROCHLOROTHIAZIDE 2.5; 6.25 MG/1; MG/1
TABLET ORAL
Qty: 90 TABLET | Refills: 0 | Status: SHIPPED | OUTPATIENT
Start: 2022-07-28

## 2022-09-23 PROBLEM — U07.1 COVID-19 VIRUS INFECTION: Status: ACTIVE | Noted: 2022-09-23

## 2022-10-29 DIAGNOSIS — I10 ESSENTIAL HYPERTENSION: ICD-10-CM

## 2022-10-29 DIAGNOSIS — E03.9 ACQUIRED HYPOTHYROIDISM: ICD-10-CM

## 2022-10-31 RX ORDER — LEVOTHYROXINE SODIUM 0.03 MG/1
TABLET ORAL
Qty: 90 TABLET | Refills: 0 | Status: SHIPPED | OUTPATIENT
Start: 2022-10-31

## 2022-10-31 RX ORDER — SPIRONOLACTONE 50 MG/1
TABLET, FILM COATED ORAL
Qty: 90 TABLET | Refills: 0 | Status: SHIPPED | OUTPATIENT
Start: 2022-10-31

## 2022-10-31 RX ORDER — BISOPROLOL FUMARATE AND HYDROCHLOROTHIAZIDE 2.5; 6.25 MG/1; MG/1
TABLET ORAL
Qty: 90 TABLET | Refills: 0 | Status: SHIPPED | OUTPATIENT
Start: 2022-10-31

## 2022-11-10 ENCOUNTER — OFFICE VISIT (OUTPATIENT)
Dept: INTERNAL MEDICINE CLINIC | Facility: CLINIC | Age: 47
End: 2022-11-10

## 2022-11-10 ENCOUNTER — TELEPHONE (OUTPATIENT)
Dept: ADMINISTRATIVE | Facility: OTHER | Age: 47
End: 2022-11-10

## 2022-11-10 VITALS
OXYGEN SATURATION: 98 % | HEIGHT: 66 IN | WEIGHT: 147 LBS | TEMPERATURE: 97.4 F | BODY MASS INDEX: 23.63 KG/M2 | SYSTOLIC BLOOD PRESSURE: 118 MMHG | DIASTOLIC BLOOD PRESSURE: 82 MMHG | HEART RATE: 72 BPM

## 2022-11-10 DIAGNOSIS — E03.9 ACQUIRED HYPOTHYROIDISM: ICD-10-CM

## 2022-11-10 DIAGNOSIS — D25.9 UTERINE LEIOMYOMA, UNSPECIFIED LOCATION: ICD-10-CM

## 2022-11-10 DIAGNOSIS — I10 ESSENTIAL HYPERTENSION: ICD-10-CM

## 2022-11-10 DIAGNOSIS — L70.8 OTHER ACNE: ICD-10-CM

## 2022-11-10 DIAGNOSIS — Z00.00 LABORATORY EXAMINATION ORDERED AS PART OF A ROUTINE GENERAL MEDICAL EXAMINATION: ICD-10-CM

## 2022-11-10 DIAGNOSIS — J45.20 MILD INTERMITTENT REACTIVE AIRWAY DISEASE WITHOUT COMPLICATION: ICD-10-CM

## 2022-11-10 DIAGNOSIS — E55.9 VITAMIN D DEFICIENCY: ICD-10-CM

## 2022-11-10 DIAGNOSIS — E78.2 MIXED HYPERLIPIDEMIA: ICD-10-CM

## 2022-11-10 DIAGNOSIS — Z00.00 HEALTH MAINTENANCE EXAMINATION: Primary | ICD-10-CM

## 2022-11-10 PROBLEM — U07.1 COVID-19 VIRUS INFECTION: Status: RESOLVED | Noted: 2022-09-23 | Resolved: 2022-11-10

## 2022-11-10 PROBLEM — R79.89 ABNORMAL THYROID BLOOD TEST: Status: RESOLVED | Noted: 2018-08-01 | Resolved: 2022-11-10

## 2022-11-10 RX ORDER — FLUTICASONE PROPIONATE AND SALMETEROL 250; 50 UG/1; UG/1
1 POWDER RESPIRATORY (INHALATION) 2 TIMES DAILY
Qty: 60 BLISTER | Refills: 5 | Status: SHIPPED | OUTPATIENT
Start: 2022-11-10

## 2022-11-10 NOTE — PROGRESS NOTES
Assessment/Plan:    Acquired hypothyroidism  Adequately replaced  Essential hypertension  BP controlled, on bisoprolol-HCTZ  Vitamin D deficiency  Taking D3 daily  Acne  On spironolactone, may increase to 100 mg  Check labs next month  Fibroid, uterine  More frequent bleeding recently  Follow up with gynecology as scheduled, for possible D&C  Reactive airway disease without complication  Start steroid inhaler, use for at least 2 weeks  Continue albuterol inhaler prn, daily antihistamine  Start saline spray/rinse daily  Diagnoses and all orders for this visit:    Health maintenance examination  Comments:  Mammogram scheduled  Will do colonoscopy  Uterine leiomyoma, unspecified location    Laboratory examination ordered as part of a routine general medical examination  -     Comprehensive metabolic panel; Future  -     Lipid panel; Future  -     Vitamin D 25 hydroxy; Future    Acquired hypothyroidism    Mixed hyperlipidemia  -     Comprehensive metabolic panel; Future    Essential hypertension  -     Comprehensive metabolic panel; Future  -     Lipid panel; Future    Vitamin D deficiency  -     Vitamin D 25 hydroxy; Future    Mild intermittent reactive airway disease without complication  -     Fluticasone-Salmeterol (Advair) 250-50 mcg/dose inhaler; Inhale 1 puff 2 (two) times a day Rinse mouth after use  Other acne    Follow up in 1 year or as needed  Subjective:      Patient ID: Karina Schaefer is a 50 y o  female here for a physical     She reports that she has had a nonproductive cough for a few weeks  Cough intermittent, has been waking her up lately in the evening  She is been using her albuterol inhaler more than once a day for the past few weeks  She reports no chest tightness or wheezing  No recent fevers, sore throat  She has mild nasal congestion and occasional postnasal drip  She is taking her Claritin daily        She has had episodes vaginal bleeding in between her periods  She has a fibroid, has seen Gynecology  She does have an appointment with them to discuss D&C  She reports occasional pelvic cramps, would take ibuprofen as needed  Her dermatologist suggested to increase her spironolactone to 100 mg due to her increase baldness  The following portions of the patient's history were reviewed and updated as appropriate: allergies, current medications, past medical history, past social history and problem list     Review of Systems   Constitutional: Negative for activity change, appetite change and fatigue  HENT: Negative for congestion, ear pain and postnasal drip  Eyes: Negative for visual disturbance  Respiratory: Positive for cough  Negative for chest tightness, shortness of breath and wheezing  Cardiovascular: Negative for chest pain and leg swelling  Gastrointestinal: Negative for abdominal pain, constipation and diarrhea  Genitourinary: Positive for menstrual problem, pelvic pain and vaginal bleeding  Negative for dysuria, frequency and urgency  Musculoskeletal: Negative for arthralgias and myalgias  Skin: Negative for rash and wound  Neurological: Negative for dizziness and headaches  Psychiatric/Behavioral: Negative for confusion and dysphoric mood  The patient is not nervous/anxious  Objective:      /82   Pulse 72   Temp (!) 97 4 °F (36 3 °C)   Ht 5' 6" (1 676 m)   Wt 66 7 kg (147 lb)   SpO2 98%   BMI 23 73 kg/m²          Physical Exam  Vitals and nursing note reviewed  Constitutional:       General: She is not in acute distress  Appearance: She is well-developed  HENT:      Head: Normocephalic and atraumatic  Right Ear: Tympanic membrane, ear canal and external ear normal       Left Ear: Tympanic membrane, ear canal and external ear normal       Nose: Congestion present  Right Sinus: No maxillary sinus tenderness  Left Sinus: No maxillary sinus tenderness        Mouth/Throat: Mouth: Mucous membranes are moist       Pharynx: Oropharynx is clear  Eyes:      Pupils: Pupils are equal, round, and reactive to light  Cardiovascular:      Rate and Rhythm: Normal rate and regular rhythm  Heart sounds: Normal heart sounds  Pulmonary:      Effort: Pulmonary effort is normal  No respiratory distress  Breath sounds: Normal breath sounds  No decreased breath sounds, wheezing or rhonchi  Abdominal:      General: Bowel sounds are normal       Palpations: Abdomen is soft  Musculoskeletal:         General: No swelling  Right lower leg: No edema  Left lower leg: No edema  Skin:     General: Skin is warm  Findings: No rash  Neurological:      General: No focal deficit present  Mental Status: She is alert and oriented to person, place, and time  Psychiatric:         Mood and Affect: Mood and affect normal  Mood is not anxious or depressed  Behavior: Behavior normal            Labs & imaging results reviewed with patient

## 2022-11-10 NOTE — TELEPHONE ENCOUNTER
----- Message from Des Ha sent at 11/10/2022  8:07 AM EST -----  Regarding: care gap request  11/10/22 8:07 AM    Hello, our patient attached above has had Mammogram completed/performed  Please assist in updating the patient chart by making an External outreach to [facility] facility  Date of service all from 2017 to current      Thank you,  Des Ha  [facility]

## 2022-11-10 NOTE — ASSESSMENT & PLAN NOTE
Start steroid inhaler, use for at least 2 weeks  Continue albuterol inhaler prn, daily antihistamine  Start saline spray/rinse daily

## 2022-11-10 NOTE — TELEPHONE ENCOUNTER
Upon review of the In Basket request we were able to locate, review, and update the patient chart as requested for Mammogram     Any additional questions or concerns should be emailed to the Practice Liaisons via the appropriate education email address, please do not reply via In Basket      Thank you  Cindy Salinas MA

## 2023-01-12 DIAGNOSIS — I10 ESSENTIAL HYPERTENSION: ICD-10-CM

## 2023-01-12 RX ORDER — SPIRONOLACTONE 100 MG/1
100 TABLET, FILM COATED ORAL DAILY
Qty: 90 TABLET | Refills: 0 | Status: SHIPPED | OUTPATIENT
Start: 2023-01-12 | End: 2023-01-19 | Stop reason: SDUPTHER

## 2023-04-02 ENCOUNTER — APPOINTMENT (OUTPATIENT)
Dept: LAB | Age: 48
End: 2023-04-02

## 2023-04-02 DIAGNOSIS — Z00.00 LABORATORY EXAMINATION ORDERED AS PART OF A ROUTINE GENERAL MEDICAL EXAMINATION: ICD-10-CM

## 2023-04-02 DIAGNOSIS — E78.2 MIXED HYPERLIPIDEMIA: ICD-10-CM

## 2023-04-02 DIAGNOSIS — I10 ESSENTIAL HYPERTENSION: ICD-10-CM

## 2023-04-02 DIAGNOSIS — E55.9 VITAMIN D DEFICIENCY: ICD-10-CM

## 2023-04-02 LAB
25(OH)D3 SERPL-MCNC: 51.9 NG/ML (ref 30–100)
ALBUMIN SERPL BCP-MCNC: 3.8 G/DL (ref 3.5–5)
ALP SERPL-CCNC: 45 U/L (ref 46–116)
ALT SERPL W P-5'-P-CCNC: 24 U/L (ref 12–78)
ANION GAP SERPL CALCULATED.3IONS-SCNC: 2 MMOL/L (ref 4–13)
AST SERPL W P-5'-P-CCNC: 19 U/L (ref 5–45)
BILIRUB SERPL-MCNC: 0.97 MG/DL (ref 0.2–1)
BUN SERPL-MCNC: 9 MG/DL (ref 5–25)
CALCIUM SERPL-MCNC: 8.8 MG/DL (ref 8.3–10.1)
CHLORIDE SERPL-SCNC: 103 MMOL/L (ref 96–108)
CHOLEST SERPL-MCNC: 234 MG/DL
CO2 SERPL-SCNC: 28 MMOL/L (ref 21–32)
CREAT SERPL-MCNC: 0.81 MG/DL (ref 0.6–1.3)
GFR SERPL CREATININE-BSD FRML MDRD: 84 ML/MIN/1.73SQ M
GLUCOSE P FAST SERPL-MCNC: 98 MG/DL (ref 65–99)
HDLC SERPL-MCNC: 40 MG/DL
LDLC SERPL CALC-MCNC: 156 MG/DL (ref 0–100)
NONHDLC SERPL-MCNC: 194 MG/DL
POTASSIUM SERPL-SCNC: 3.7 MMOL/L (ref 3.5–5.3)
PROT SERPL-MCNC: 7.2 G/DL (ref 6.4–8.4)
SODIUM SERPL-SCNC: 133 MMOL/L (ref 135–147)
TRIGL SERPL-MCNC: 189 MG/DL

## 2023-06-14 DIAGNOSIS — J45.20 MILD INTERMITTENT REACTIVE AIRWAY DISEASE WITHOUT COMPLICATION: ICD-10-CM

## 2023-06-14 RX ORDER — ALBUTEROL SULFATE 90 UG/1
1-2 AEROSOL, METERED RESPIRATORY (INHALATION) EVERY 6 HOURS PRN
Qty: 8 G | Refills: 0 | Status: CANCELLED | OUTPATIENT
Start: 2023-06-14

## 2023-06-15 DIAGNOSIS — J45.20 MILD INTERMITTENT REACTIVE AIRWAY DISEASE WITHOUT COMPLICATION: ICD-10-CM

## 2023-06-15 RX ORDER — ALBUTEROL SULFATE 90 UG/1
1-2 AEROSOL, METERED RESPIRATORY (INHALATION) EVERY 6 HOURS PRN
Qty: 8 G | Refills: 1 | Status: SHIPPED | OUTPATIENT
Start: 2023-06-15

## 2023-06-15 RX ORDER — ALBUTEROL SULFATE 90 UG/1
1-2 AEROSOL, METERED RESPIRATORY (INHALATION) EVERY 6 HOURS PRN
Qty: 8 G | Refills: 1 | Status: SHIPPED | OUTPATIENT
Start: 2023-06-15 | End: 2023-06-15 | Stop reason: SDUPTHER

## 2023-07-12 DIAGNOSIS — E03.9 ACQUIRED HYPOTHYROIDISM: ICD-10-CM

## 2023-07-12 RX ORDER — LEVOTHYROXINE SODIUM 0.03 MG/1
TABLET ORAL
Qty: 90 TABLET | Refills: 0 | Status: SHIPPED | OUTPATIENT
Start: 2023-07-12

## 2023-07-13 RX ORDER — LEVOTHYROXINE SODIUM 0.03 MG/1
25 TABLET ORAL DAILY
Qty: 90 TABLET | Refills: 0 | OUTPATIENT
Start: 2023-07-13

## 2023-09-15 NOTE — TELEPHONE ENCOUNTER
Patient called in, this rx needs to go to [address] [place] [address]  I sent it in, as it was approved by PCP  Female

## 2023-09-21 ENCOUNTER — TELEPHONE (OUTPATIENT)
Age: 48
End: 2023-09-21

## 2023-09-21 DIAGNOSIS — Z12.11 SCREENING FOR COLON CANCER: Primary | ICD-10-CM

## 2023-09-21 NOTE — TELEPHONE ENCOUNTER
Please call patient.   Do you have the colonoscopy scheduled or you need a referral to see someone first?

## 2023-09-22 ENCOUNTER — TELEPHONE (OUTPATIENT)
Age: 48
End: 2023-09-22

## 2023-09-22 ENCOUNTER — PREP FOR PROCEDURE (OUTPATIENT)
Age: 48
End: 2023-09-22

## 2023-09-22 DIAGNOSIS — Z12.11 SCREENING FOR COLON CANCER: Primary | ICD-10-CM

## 2023-09-22 NOTE — TELEPHONE ENCOUNTER
Spoke to patient   She tried calling [facility] to schedule and they stated she needed referral- does not need insurance referral just doctor to doctor

## 2023-09-22 NOTE — TELEPHONE ENCOUNTER
Scheduled date of colonoscopy (as of today):11.09.23    Physician performing colonoscopy:Regina    Location of colonoscopy:[facility]    Bowel prep reviewed with patient:Dania    Instructions reviewed with patient by:Vega

## 2023-10-05 DIAGNOSIS — E03.9 ACQUIRED HYPOTHYROIDISM: ICD-10-CM

## 2023-10-05 DIAGNOSIS — I10 ESSENTIAL HYPERTENSION: ICD-10-CM

## 2023-10-05 RX ORDER — BISOPROLOL FUMARATE AND HYDROCHLOROTHIAZIDE 2.5; 6.25 MG/1; MG/1
1 TABLET ORAL DAILY
Qty: 90 TABLET | Refills: 0 | Status: SHIPPED | OUTPATIENT
Start: 2023-10-05

## 2023-10-05 RX ORDER — SPIRONOLACTONE 100 MG/1
100 TABLET, FILM COATED ORAL DAILY
Qty: 90 TABLET | Refills: 0 | Status: SHIPPED | OUTPATIENT
Start: 2023-10-05

## 2023-10-05 RX ORDER — LEVOTHYROXINE SODIUM 0.03 MG/1
25 TABLET ORAL DAILY
Qty: 90 TABLET | Refills: 0 | Status: SHIPPED | OUTPATIENT
Start: 2023-10-05

## 2023-11-09 ENCOUNTER — HOSPITAL ENCOUNTER (OUTPATIENT)
Dept: GASTROENTEROLOGY | Facility: HOSPITAL | Age: 48
Setting detail: OUTPATIENT SURGERY
End: 2023-11-09
Attending: INTERNAL MEDICINE
Payer: COMMERCIAL

## 2023-11-09 ENCOUNTER — ANESTHESIA EVENT (OUTPATIENT)
Dept: GASTROENTEROLOGY | Facility: HOSPITAL | Age: 48
End: 2023-11-09

## 2023-11-09 ENCOUNTER — ANESTHESIA (OUTPATIENT)
Dept: GASTROENTEROLOGY | Facility: HOSPITAL | Age: 48
End: 2023-11-09

## 2023-11-09 VITALS
TEMPERATURE: 96.2 F | SYSTOLIC BLOOD PRESSURE: 119 MMHG | DIASTOLIC BLOOD PRESSURE: 75 MMHG | OXYGEN SATURATION: 97 % | HEIGHT: 62 IN | HEART RATE: 64 BPM | RESPIRATION RATE: 18 BRPM | WEIGHT: 145 LBS | BODY MASS INDEX: 26.68 KG/M2

## 2023-11-09 DIAGNOSIS — Z12.11 SCREENING FOR COLON CANCER: ICD-10-CM

## 2023-11-09 PROCEDURE — 88342 IMHCHEM/IMCYTCHM 1ST ANTB: CPT | Performed by: PATHOLOGY

## 2023-11-09 PROCEDURE — 88305 TISSUE EXAM BY PATHOLOGIST: CPT | Performed by: PATHOLOGY

## 2023-11-09 PROCEDURE — 45385 COLONOSCOPY W/LESION REMOVAL: CPT | Performed by: INTERNAL MEDICINE

## 2023-11-09 RX ORDER — LIDOCAINE HYDROCHLORIDE 10 MG/ML
INJECTION, SOLUTION EPIDURAL; INFILTRATION; INTRACAUDAL; PERINEURAL AS NEEDED
Status: DISCONTINUED | OUTPATIENT
Start: 2023-11-09 | End: 2023-11-09

## 2023-11-09 RX ORDER — PROPOFOL 10 MG/ML
INJECTION, EMULSION INTRAVENOUS AS NEEDED
Status: DISCONTINUED | OUTPATIENT
Start: 2023-11-09 | End: 2023-11-09

## 2023-11-09 RX ORDER — PROPOFOL 10 MG/ML
INJECTION, EMULSION INTRAVENOUS CONTINUOUS PRN
Status: DISCONTINUED | OUTPATIENT
Start: 2023-11-09 | End: 2023-11-09

## 2023-11-09 RX ORDER — SODIUM CHLORIDE, SODIUM LACTATE, POTASSIUM CHLORIDE, CALCIUM CHLORIDE 600; 310; 30; 20 MG/100ML; MG/100ML; MG/100ML; MG/100ML
INJECTION, SOLUTION INTRAVENOUS CONTINUOUS PRN
Status: DISCONTINUED | OUTPATIENT
Start: 2023-11-09 | End: 2023-11-09

## 2023-11-09 RX ADMIN — PROPOFOL 100 MG: 10 INJECTION, EMULSION INTRAVENOUS at 15:24

## 2023-11-09 RX ADMIN — Medication 40 MG: at 15:33

## 2023-11-09 RX ADMIN — PROPOFOL 100 MCG/KG/MIN: 10 INJECTION, EMULSION INTRAVENOUS at 15:26

## 2023-11-09 RX ADMIN — SODIUM CHLORIDE, SODIUM LACTATE, POTASSIUM CHLORIDE, AND CALCIUM CHLORIDE: .6; .31; .03; .02 INJECTION, SOLUTION INTRAVENOUS at 15:21

## 2023-11-09 RX ADMIN — LIDOCAINE HYDROCHLORIDE 5 ML: 10 INJECTION, SOLUTION EPIDURAL; INFILTRATION; INTRACAUDAL; PERINEURAL at 15:23

## 2023-11-09 NOTE — H&P
History and Physical -  [facility]  Roland Person 46 y.o. female MRN: [id number]                  HPI: Roland Person is a 46y.o. year old female who presents for colonoscopy for index CRC screening      REVIEW OF SYSTEMS: Per the HPI, and otherwise unremarkable.     Historical Information   Past Medical History:   Diagnosis Date    Abnormal mammography     last assessed - 06Oct2014    Anxiety     was on xanax    Hypertension     Supraspinatus tendonitis, right      Past Surgical History:   Procedure Laterality Date    DILATION AND CURETTAGE OF UTERUS      1/2023    REDUCTION MAMMAPLASTY       Social History   Social History     Substance and Sexual Activity   Alcohol Use Yes    Comment: social drinker     Social History     Substance and Sexual Activity   Drug Use No     Social History     Tobacco Use   Smoking Status Never   Smokeless Tobacco Never     Family History   Problem Relation Age of Onset    Osteopenia Mother     Osteoporosis Mother     Stomach cancer Father     Osteopenia Father     Colon cancer Maternal Grandfather     Breast cancer Paternal Grandmother     Heart disease Family     Stroke Family     Prostate cancer Paternal Uncle     Alcohol abuse Neg Hx     Substance Abuse Neg Hx     Mental illness Neg Hx     Depression Neg Hx        Meds/Allergies       Current Outpatient Medications:     Biotin 5000 MCG CAPS    bisoprolol-hydrochlorothiazide (ZIAC) 2.5-6.25 MG per tablet    Calcium Carb-Cholecalciferol 1000-800 MG-UNIT TABS    cholecalciferol (VITAMIN D3) 1,000 units tablet    levothyroxine 25 mcg tablet    Multiple Vitamin (multivitamin) tablet    spironolactone (ALDACTONE) 100 mg tablet    albuterol (ProAir HFA) 90 mcg/act inhaler    Fluticasone-Salmeterol (Advair) 250-50 mcg/dose inhaler    No Known Allergies    Objective     /76   Pulse 89   Temp (!) 96.3 °F (35.7 °C) (Tympanic)   Resp 17   Ht 5' 1.5" (1.562 m)   Wt 65.8 kg (145 lb)   SpO2 99%   BMI 26.95 kg/m²       PHYSICAL EXAM    Gen: NAD  Head: NCAT  CV: RRR  CHEST: Clear  ABD: soft, NT/ND  EXT: no edema      ASSESSMENT/PLAN:  This is a 46y.o. year old female here for colonoscopy, and she is stable and optimized for her procedure.

## 2023-11-09 NOTE — ANESTHESIA PREPROCEDURE EVALUATION
Procedure:  COLONOSCOPY    Relevant Problems   CARDIO   (+) Essential hypertension   (+) Mixed hyperlipidemia      ENDO   (+) Acquired hypothyroidism        Physical Exam    Airway    Mallampati score: II  TM Distance: >3 FB  Neck ROM: full     Dental       Cardiovascular  Cardiovascular exam normal    Pulmonary  Pulmonary exam normal     Other Findings        Anesthesia Plan  ASA Score- 2     Anesthesia Type- IV sedation with anesthesia with ASA Monitors. Additional Monitors:     Airway Plan:            Plan Factors-Exercise tolerance (METS): >4 METS. Chart reviewed. EKG reviewed. Imaging results reviewed. Existing labs reviewed. Patient summary reviewed. Induction- intravenous. Postoperative Plan-     Informed Consent- Anesthetic plan and risks discussed with patient. I personally reviewed this patient with the CRNA. Discussed and agreed on the Anesthesia Plan with the CRNA. Gaudencio Sweet               Acne   Acquired hypothyroidism   Allergic rhinitis   Allergy   Essential hypertension   Fibroid, uterine   Mixed hyperlipidemia   Reactive airway disease without complication   Stress at work   Vitamin D deficiency

## 2023-11-09 NOTE — ANESTHESIA POSTPROCEDURE EVALUATION
Post-Op Assessment Note    CV Status:  Stable  Pain Score: 0    Pain management: adequate     Mental Status:  Sleepy   Hydration Status:  Stable   PONV Controlled:  None   Airway Patency:  Patent   Two or more mitigation strategies used for obstructive sleep apnea   Post Op Vitals Reviewed: Yes      Staff: CRNA         No notable events documented.     /58 (11/09/23 1555)    Temp (!) 96.2 °F (35.7 °C) (11/09/23 1555)    Pulse 68 (11/09/23 1555)   Resp 16 (11/09/23 1555)    SpO2 99 % (11/09/23 1555)

## 2023-11-13 ENCOUNTER — OFFICE VISIT (OUTPATIENT)
Dept: INTERNAL MEDICINE CLINIC | Facility: CLINIC | Age: 48
End: 2023-11-13
Payer: COMMERCIAL

## 2023-11-13 VITALS
OXYGEN SATURATION: 99 % | WEIGHT: 145 LBS | TEMPERATURE: 97.5 F | SYSTOLIC BLOOD PRESSURE: 114 MMHG | HEIGHT: 62 IN | BODY MASS INDEX: 26.68 KG/M2 | HEART RATE: 64 BPM | DIASTOLIC BLOOD PRESSURE: 72 MMHG

## 2023-11-13 DIAGNOSIS — K21.9 GASTROESOPHAGEAL REFLUX DISEASE, UNSPECIFIED WHETHER ESOPHAGITIS PRESENT: ICD-10-CM

## 2023-11-13 DIAGNOSIS — I10 ESSENTIAL HYPERTENSION: ICD-10-CM

## 2023-11-13 DIAGNOSIS — Z00.00 LABORATORY EXAMINATION ORDERED AS PART OF A ROUTINE GENERAL MEDICAL EXAMINATION: ICD-10-CM

## 2023-11-13 DIAGNOSIS — J45.20 MILD INTERMITTENT REACTIVE AIRWAY DISEASE WITHOUT COMPLICATION: ICD-10-CM

## 2023-11-13 DIAGNOSIS — Z00.00 HEALTH MAINTENANCE EXAMINATION: Primary | ICD-10-CM

## 2023-11-13 DIAGNOSIS — Z79.899 ENCOUNTER FOR LONG-TERM CURRENT USE OF MEDICATION: ICD-10-CM

## 2023-11-13 DIAGNOSIS — E78.2 MIXED HYPERLIPIDEMIA: ICD-10-CM

## 2023-11-13 DIAGNOSIS — J30.89 NON-SEASONAL ALLERGIC RHINITIS DUE TO OTHER ALLERGIC TRIGGER: ICD-10-CM

## 2023-11-13 DIAGNOSIS — L70.8 OTHER ACNE: ICD-10-CM

## 2023-11-13 DIAGNOSIS — E55.9 VITAMIN D DEFICIENCY: ICD-10-CM

## 2023-11-13 DIAGNOSIS — E03.9 ACQUIRED HYPOTHYROIDISM: ICD-10-CM

## 2023-11-13 PROCEDURE — 99396 PREV VISIT EST AGE 40-64: CPT | Performed by: INTERNAL MEDICINE

## 2023-11-13 NOTE — PROGRESS NOTES
Assessment/Plan:    Acquired hypothyroidism  Labs due, taking appropriately. Essential hypertension  BP controlled, on bisoprolol-HCTZ. Mixed hyperlipidemia  Lipids elevated. Reviewed diet. Not on medication, low risk. Vitamin D deficiency  Taking D3. Acne  On daily spironolactone. Fibroid, uterine  No recent issues, D&C earlier this year. Allergic rhinitis  Uses Flonase prn. Reactive airway disease without complication  No recent symptoms. Diagnoses and all orders for this visit:    Health maintenance examination  Comments:  Repeat colonoscopy 2026. Stable weight. Flu vaccine updated. Repeat mammogram scheduled. Gastroesophageal reflux disease, unspecified whether esophagitis present  Comments:  Avoid late night meals/snacks. Acquired hypothyroidism  -     TSH, 3rd generation with Free T4 reflex    Mixed hyperlipidemia  -     Comprehensive metabolic panel  -     Lipid panel    Non-seasonal allergic rhinitis due to other allergic trigger    Other acne    Essential hypertension  -     CBC and differential    Vitamin D deficiency  -     Vitamin D 25 hydroxy    Mild intermittent reactive airway disease without complication    Encounter for long-term current use of medication  -     Vitamin B12    Laboratory examination ordered as part of a routine general medical examination  -     CBC and differential  -     Comprehensive metabolic panel  -     Lipid panel  -     TSH, 3rd generation with Free T4 reflex  -     Vitamin B12  -     Vitamin D 25 hydroxy      Follow up in 1 year or as needed. Subjective:      Patient ID: Mckenzie Hirsch is a 46 y.o. female here for a physical.    She reports an episode of reflux at night, work her up early in the morning. She did have ice cream late in that evening. She does not drink a lot of coffee or carbonated drinks. She does not eat a lot of spicy foods. She has not taken any over-the-counter medication.     She was sick earlier this summer, in July after traveling in [place]. By the time she got home she tested negative for COVID. She returned from friends last month, had mild cough and cold symptoms. He tested negative for COVID. She reports no further vaginal bleeding, had a D&C earlier this year. She had a colonoscopy recently, no issues with bowel movements. The following portions of the patient's history were reviewed and updated as appropriate: allergies, current medications, past medical history, past social history, and problem list.    Review of Systems   Constitutional:  Negative for appetite change and fatigue. HENT:  Negative for congestion, ear pain and postnasal drip. Eyes:  Negative for visual disturbance. Respiratory:  Negative for cough and shortness of breath. Cardiovascular:  Negative for chest pain and leg swelling. Gastrointestinal:  Negative for abdominal pain, constipation and diarrhea. Genitourinary:  Negative for dysuria, frequency and urgency. Musculoskeletal:  Negative for arthralgias and myalgias. Skin:  Negative for rash and wound. Neurological:  Negative for dizziness, numbness and headaches. Hematological:  Does not bruise/bleed easily. Psychiatric/Behavioral:  Negative for confusion. The patient is not nervous/anxious. Objective:      /72   Pulse 64   Temp 97.5 °F (36.4 °C)   Ht 5' 1.5" (1.562 m)   Wt 65.8 kg (145 lb)   LMP 11/09/2023 (Exact Date)   SpO2 99%   BMI 26.95 kg/m²          Physical Exam  Vitals and nursing note reviewed. Constitutional:       General: She is not in acute distress. Appearance: She is well-developed. HENT:      Head: Normocephalic and atraumatic. Right Ear: Tympanic membrane, ear canal and external ear normal.      Left Ear: Tympanic membrane, ear canal and external ear normal.      Mouth/Throat:      Mouth: Mucous membranes are moist.   Eyes:      Pupils: Pupils are equal, round, and reactive to light.    Cardiovascular: Rate and Rhythm: Normal rate and regular rhythm. Heart sounds: Normal heart sounds. Pulmonary:      Effort: Pulmonary effort is normal.      Breath sounds: Normal breath sounds. No wheezing. Abdominal:      General: Bowel sounds are normal.      Palpations: Abdomen is soft. Musculoskeletal:         General: No swelling. Right lower leg: No edema. Left lower leg: No edema. Skin:     General: Skin is warm. Findings: No rash. Neurological:      General: No focal deficit present. Mental Status: She is alert and oriented to person, place, and time. Psychiatric:         Mood and Affect: Mood and affect normal. Mood is not anxious or depressed. Behavior: Behavior normal.             Labs & imaging results reviewed with patient. BMI Counseling: Body mass index is 26.95 kg/m². The BMI is above normal. Nutrition recommendations include 3-5 servings of fruits/vegetables daily. Exercise recommendations include strength training exercises.

## 2023-11-14 PROCEDURE — 88342 IMHCHEM/IMCYTCHM 1ST ANTB: CPT | Performed by: PATHOLOGY

## 2023-11-14 PROCEDURE — 88305 TISSUE EXAM BY PATHOLOGIST: CPT | Performed by: PATHOLOGY

## 2023-11-15 ENCOUNTER — NURSE TRIAGE (OUTPATIENT)
Age: 48
End: 2023-11-15

## 2023-11-15 NOTE — TELEPHONE ENCOUNTER
Called patient and explained her colonoscopy pathology results and advised she is recommended to have repeat colonoscopy in 3 years.

## 2023-12-20 ENCOUNTER — TRANSCRIBE ORDERS (OUTPATIENT)
Dept: GASTROENTEROLOGY | Facility: CLINIC | Age: 48
End: 2023-12-20

## 2024-01-12 DIAGNOSIS — I10 ESSENTIAL HYPERTENSION: ICD-10-CM

## 2024-01-12 DIAGNOSIS — E03.9 ACQUIRED HYPOTHYROIDISM: ICD-10-CM

## 2024-01-15 RX ORDER — SPIRONOLACTONE 100 MG/1
100 TABLET, FILM COATED ORAL DAILY
Qty: 90 TABLET | Refills: 2 | Status: SHIPPED | OUTPATIENT
Start: 2024-01-15 | End: 2024-01-26 | Stop reason: SDUPTHER

## 2024-01-15 RX ORDER — LEVOTHYROXINE SODIUM 0.03 MG/1
25 TABLET ORAL DAILY
Qty: 90 TABLET | Refills: 2 | Status: SHIPPED | OUTPATIENT
Start: 2024-01-15

## 2024-01-15 RX ORDER — BISOPROLOL FUMARATE AND HYDROCHLOROTHIAZIDE 2.5; 6.25 MG/1; MG/1
1 TABLET ORAL DAILY
Qty: 90 TABLET | Refills: 2 | Status: SHIPPED | OUTPATIENT
Start: 2024-01-15

## 2024-01-26 RX ORDER — SPIRONOLACTONE 100 MG/1
100 TABLET, FILM COATED ORAL DAILY
Qty: 30 TABLET | Refills: 0 | Status: SHIPPED | OUTPATIENT
Start: 2024-01-26

## 2024-02-15 ENCOUNTER — APPOINTMENT (OUTPATIENT)
Dept: LAB | Age: 49
End: 2024-02-15
Payer: COMMERCIAL

## 2024-02-15 LAB
25(OH)D3 SERPL-MCNC: 53 NG/ML (ref 30–100)
ALBUMIN SERPL BCP-MCNC: 4.4 G/DL (ref 3.5–5)
ALP SERPL-CCNC: 41 U/L (ref 34–104)
ALT SERPL W P-5'-P-CCNC: 25 U/L (ref 7–52)
ANION GAP SERPL CALCULATED.3IONS-SCNC: 9 MMOL/L
AST SERPL W P-5'-P-CCNC: 24 U/L (ref 13–39)
BASOPHILS # BLD AUTO: 0.05 THOUSANDS/ÂΜL (ref 0–0.1)
BASOPHILS NFR BLD AUTO: 1 % (ref 0–1)
BILIRUB SERPL-MCNC: 1.65 MG/DL (ref 0.2–1)
BUN SERPL-MCNC: 12 MG/DL (ref 5–25)
CALCIUM SERPL-MCNC: 9.2 MG/DL (ref 8.4–10.2)
CHLORIDE SERPL-SCNC: 97 MMOL/L (ref 96–108)
CHOLEST SERPL-MCNC: 218 MG/DL
CO2 SERPL-SCNC: 30 MMOL/L (ref 21–32)
CREAT SERPL-MCNC: 0.79 MG/DL (ref 0.6–1.3)
EOSINOPHIL # BLD AUTO: 0.06 THOUSAND/ÂΜL (ref 0–0.61)
EOSINOPHIL NFR BLD AUTO: 1 % (ref 0–6)
ERYTHROCYTE [DISTWIDTH] IN BLOOD BY AUTOMATED COUNT: 12 % (ref 11.6–15.1)
GFR SERPL CREATININE-BSD FRML MDRD: 87 ML/MIN/1.73SQ M
GLUCOSE P FAST SERPL-MCNC: 86 MG/DL (ref 65–99)
HCT VFR BLD AUTO: 41.8 % (ref 34.8–46.1)
HDLC SERPL-MCNC: 35 MG/DL
HGB BLD-MCNC: 14.6 G/DL (ref 11.5–15.4)
IMM GRANULOCYTES # BLD AUTO: 0.02 THOUSAND/UL (ref 0–0.2)
IMM GRANULOCYTES NFR BLD AUTO: 0 % (ref 0–2)
LDLC SERPL CALC-MCNC: 138 MG/DL (ref 0–100)
LYMPHOCYTES # BLD AUTO: 2.28 THOUSANDS/ÂΜL (ref 0.6–4.47)
LYMPHOCYTES NFR BLD AUTO: 38 % (ref 14–44)
MCH RBC QN AUTO: 30.4 PG (ref 26.8–34.3)
MCHC RBC AUTO-ENTMCNC: 34.9 G/DL (ref 31.4–37.4)
MCV RBC AUTO: 87 FL (ref 82–98)
MONOCYTES # BLD AUTO: 0.51 THOUSAND/ÂΜL (ref 0.17–1.22)
MONOCYTES NFR BLD AUTO: 9 % (ref 4–12)
NEUTROPHILS # BLD AUTO: 3.1 THOUSANDS/ÂΜL (ref 1.85–7.62)
NEUTS SEG NFR BLD AUTO: 51 % (ref 43–75)
NONHDLC SERPL-MCNC: 183 MG/DL
NRBC BLD AUTO-RTO: 0 /100 WBCS
PLATELET # BLD AUTO: 254 THOUSANDS/UL (ref 149–390)
PMV BLD AUTO: 10.3 FL (ref 8.9–12.7)
POTASSIUM SERPL-SCNC: 3.6 MMOL/L (ref 3.5–5.3)
PROT SERPL-MCNC: 7.3 G/DL (ref 6.4–8.4)
RBC # BLD AUTO: 4.8 MILLION/UL (ref 3.81–5.12)
SODIUM SERPL-SCNC: 136 MMOL/L (ref 135–147)
T4 FREE SERPL-MCNC: 0.7 NG/DL (ref 0.61–1.12)
TRIGL SERPL-MCNC: 224 MG/DL
TSH SERPL DL<=0.05 MIU/L-ACNC: 5.78 UIU/ML (ref 0.45–4.5)
VIT B12 SERPL-MCNC: 605 PG/ML (ref 180–914)
WBC # BLD AUTO: 6.02 THOUSAND/UL (ref 4.31–10.16)

## 2024-02-21 DIAGNOSIS — I10 ESSENTIAL HYPERTENSION: ICD-10-CM

## 2024-02-21 DIAGNOSIS — E03.9 ACQUIRED HYPOTHYROIDISM: Primary | ICD-10-CM

## 2024-04-14 ENCOUNTER — APPOINTMENT (OUTPATIENT)
Dept: LAB | Age: 49
End: 2024-04-14
Payer: COMMERCIAL

## 2024-04-14 DIAGNOSIS — E03.9 ACQUIRED HYPOTHYROIDISM: ICD-10-CM

## 2024-04-14 DIAGNOSIS — I10 ESSENTIAL HYPERTENSION: ICD-10-CM

## 2024-04-14 LAB
ALBUMIN SERPL BCP-MCNC: 4.2 G/DL (ref 3.5–5)
ALP SERPL-CCNC: 36 U/L (ref 34–104)
ALT SERPL W P-5'-P-CCNC: 17 U/L (ref 7–52)
AST SERPL W P-5'-P-CCNC: 21 U/L (ref 13–39)
BILIRUB DIRECT SERPL-MCNC: 0.14 MG/DL (ref 0–0.2)
BILIRUB SERPL-MCNC: 0.98 MG/DL (ref 0.2–1)
PROT SERPL-MCNC: 7 G/DL (ref 6.4–8.4)
TSH SERPL DL<=0.05 MIU/L-ACNC: 2.83 UIU/ML (ref 0.45–4.5)

## 2024-04-14 PROCEDURE — 36415 COLL VENOUS BLD VENIPUNCTURE: CPT

## 2024-04-14 PROCEDURE — 80076 HEPATIC FUNCTION PANEL: CPT

## 2024-04-14 PROCEDURE — 84443 ASSAY THYROID STIM HORMONE: CPT

## 2024-05-09 DIAGNOSIS — I10 ESSENTIAL HYPERTENSION: ICD-10-CM

## 2024-05-09 RX ORDER — SPIRONOLACTONE 100 MG/1
100 TABLET, FILM COATED ORAL DAILY
Qty: 90 TABLET | Refills: 1 | Status: SHIPPED | OUTPATIENT
Start: 2024-05-09

## 2024-11-20 DIAGNOSIS — I10 ESSENTIAL HYPERTENSION: ICD-10-CM

## 2024-11-20 DIAGNOSIS — E03.9 ACQUIRED HYPOTHYROIDISM: ICD-10-CM

## 2024-11-21 DIAGNOSIS — E03.9 ACQUIRED HYPOTHYROIDISM: ICD-10-CM

## 2024-11-21 RX ORDER — LEVOTHYROXINE SODIUM 25 UG/1
25 TABLET ORAL DAILY
Qty: 90 TABLET | Refills: 0 | Status: SHIPPED | OUTPATIENT
Start: 2024-11-21

## 2024-11-21 NOTE — TELEPHONE ENCOUNTER
Message sent via [employer].   Hi Dr. Parikh,     I just renewed my prescriptions, however, my levothyroxine script is going to run out before I can get back to [place].  I am requesting a 30 day supply be called in to      [facility]   [address] [place] [id number]     Thank you.  Please me know if there are any questions.     Best,  Margo De La Paz

## 2024-11-22 RX ORDER — LEVOTHYROXINE SODIUM 25 UG/1
25 TABLET ORAL DAILY
Qty: 90 TABLET | Refills: 0 | OUTPATIENT
Start: 2024-11-22

## 2024-11-22 RX ORDER — BISOPROLOL FUMARATE AND HYDROCHLOROTHIAZIDE 2.5; 6.25 MG/1; MG/1
1 TABLET ORAL DAILY
Qty: 90 TABLET | Refills: 0 | Status: SHIPPED | OUTPATIENT
Start: 2024-11-22

## 2024-11-22 RX ORDER — SPIRONOLACTONE 100 MG/1
100 TABLET, FILM COATED ORAL DAILY
Qty: 90 TABLET | Refills: 0 | Status: SHIPPED | OUTPATIENT
Start: 2024-11-22

## 2024-12-20 ENCOUNTER — OFFICE VISIT (OUTPATIENT)
Dept: INTERNAL MEDICINE CLINIC | Facility: CLINIC | Age: 49
End: 2024-12-20
Payer: COMMERCIAL

## 2024-12-20 VITALS
SYSTOLIC BLOOD PRESSURE: 118 MMHG | HEIGHT: 61 IN | HEART RATE: 82 BPM | BODY MASS INDEX: 27.38 KG/M2 | DIASTOLIC BLOOD PRESSURE: 70 MMHG | OXYGEN SATURATION: 98 % | WEIGHT: 145 LBS | TEMPERATURE: 97 F

## 2024-12-20 DIAGNOSIS — E78.2 MIXED HYPERLIPIDEMIA: ICD-10-CM

## 2024-12-20 DIAGNOSIS — L70.8 OTHER ACNE: ICD-10-CM

## 2024-12-20 DIAGNOSIS — Z00.00 ANNUAL PHYSICAL EXAM: ICD-10-CM

## 2024-12-20 DIAGNOSIS — E55.9 VITAMIN D DEFICIENCY: ICD-10-CM

## 2024-12-20 DIAGNOSIS — M77.51 RIGHT ANKLE TENDONITIS: ICD-10-CM

## 2024-12-20 DIAGNOSIS — J45.20 MILD INTERMITTENT REACTIVE AIRWAY DISEASE WITHOUT COMPLICATION: ICD-10-CM

## 2024-12-20 DIAGNOSIS — J30.89 NON-SEASONAL ALLERGIC RHINITIS DUE TO OTHER ALLERGIC TRIGGER: ICD-10-CM

## 2024-12-20 DIAGNOSIS — E03.9 ACQUIRED HYPOTHYROIDISM: Primary | ICD-10-CM

## 2024-12-20 DIAGNOSIS — I10 ESSENTIAL HYPERTENSION: ICD-10-CM

## 2024-12-20 DIAGNOSIS — Z00.00 LABORATORY EXAMINATION ORDERED AS PART OF A ROUTINE GENERAL MEDICAL EXAMINATION: ICD-10-CM

## 2024-12-20 DIAGNOSIS — Z12.31 ENCOUNTER FOR SCREENING MAMMOGRAM FOR BREAST CANCER: ICD-10-CM

## 2024-12-20 DIAGNOSIS — L65.8 FEMALE PATTERN HAIR LOSS: ICD-10-CM

## 2024-12-20 PROCEDURE — 99214 OFFICE O/P EST MOD 30 MIN: CPT | Performed by: INTERNAL MEDICINE

## 2024-12-20 PROCEDURE — 99396 PREV VISIT EST AGE 40-64: CPT | Performed by: INTERNAL MEDICINE

## 2024-12-20 RX ORDER — FLUTICASONE PROPIONATE 50 MCG
1 SPRAY, SUSPENSION (ML) NASAL DAILY
COMMUNITY

## 2024-12-20 NOTE — PROGRESS NOTES
Name: Margo De La Paz      : 1975      MRN: [id number]  Encounter Provider: Antonia Parikh MD  Encounter Date: 2024   Encounter department: [facility] INTERNAL MEDICINE  :  Assessment & Plan  Acquired hypothyroidism  Adequately replaced.  Orders:  •  TSH, 3rd generation with Free T4 reflex; Future    Essential hypertension  BP controlled on bisoprolol-HCTZ 2.5-6.25 mg daily.  Orders:  •  CBC and differential; Future    Other acne  On spironolactone 100 mg daily.       Female pattern hair loss  Discussed oral minoxidil, recommend Rogaine.  Follow up with dermatology.       Non-seasonal allergic rhinitis due to other allergic trigger  Uses Flonase prn.       Mild intermittent reactive airway disease without complication  Has not needed inhaler.       Mixed hyperlipidemia  Low risk, not on medication.  Orders:  •  Comprehensive metabolic panel; Future  •  Lipid panel; Future    Laboratory examination ordered as part of a routine general medical examination    Orders:  •  CBC and differential; Future  •  Comprehensive metabolic panel; Future  •  Lipid panel; Future  •  Vitamin D 25 hydroxy; Future  •  TSH, 3rd generation with Free T4 reflex; Future    Vitamin D deficiency    Orders:  •  Vitamin D 25 hydroxy; Future    Encounter for screening mammogram for breast cancer         Annual physical exam  Flu vaccine updated. Repeat colonoscopy .       Right ankle tendonitis  Resolved.       Follow up in 1 year or as needed.         History of Present Illness     She injured her right ankle while working out several months ago.  She was seen at urgent care.  She wore a boot for 2.5 weeks and was instructed orthopedics she can stop wearing it.  She went to physical therapy.  Symptoms have all since resolved.  She since restarted her regular exercise.  She recalls having right hip pain after running a marathon several years ago.    She spoke to her dermatologist about oral minoxidil.  She is  "reluctant to start this due to hair growth and other parts of the body.  She continues to be on spironolactone for acne, has had no issues with this.    She has not needed her inhaler recently.  She reports usually developing a cold after Thanksgiving but did not since this year.      Review of Systems   Constitutional:  Negative for appetite change and fatigue.   HENT:  Negative for congestion, ear pain and postnasal drip.    Eyes:  Negative for visual disturbance.   Respiratory:  Negative for cough and shortness of breath.    Cardiovascular:  Negative for chest pain and leg swelling.   Gastrointestinal:  Negative for abdominal pain, constipation and diarrhea.   Genitourinary:  Negative for dysuria, frequency and urgency.   Musculoskeletal:  Negative for arthralgias, joint swelling and myalgias.   Skin:  Negative for rash and wound.   Neurological:  Negative for dizziness, numbness and headaches.   Hematological:  Does not bruise/bleed easily.   Psychiatric/Behavioral:  Negative for confusion. The patient is not nervous/anxious.        Objective   /70   Pulse 82   Temp (!) 97 °F (36.1 °C)   Ht 5' 1\" (1.549 m)   Wt 65.8 kg (145 lb)   SpO2 98%   BMI 27.40 kg/m²      Physical Exam  Vitals and nursing note reviewed.   Constitutional:       General: She is not in acute distress.     Appearance: She is well-developed.   HENT:      Head: Normocephalic and atraumatic.      Right Ear: Tympanic membrane, ear canal and external ear normal.      Left Ear: Tympanic membrane, ear canal and external ear normal.      Mouth/Throat:      Mouth: Mucous membranes are moist.   Eyes:      Pupils: Pupils are equal, round, and reactive to light.   Cardiovascular:      Rate and Rhythm: Normal rate and regular rhythm.      Heart sounds: Normal heart sounds.   Pulmonary:      Effort: Pulmonary effort is normal.      Breath sounds: Normal breath sounds. No wheezing.   Abdominal:      General: Bowel sounds are normal.      " Palpations: Abdomen is soft.   Musculoskeletal:         General: No swelling.      Right lower leg: No edema.      Left lower leg: No edema.   Skin:     General: Skin is warm.      Findings: No rash.   Neurological:      General: No focal deficit present.      Mental Status: She is alert and oriented to person, place, and time.   Psychiatric:         Mood and Affect: Mood and affect normal. Mood is not anxious or depressed.         Behavior: Behavior normal.           Labs & imaging results reviewed with patient.

## 2024-12-20 NOTE — ASSESSMENT & PLAN NOTE
Low risk, not on medication.  Orders:  •  Comprehensive metabolic panel; Future  •  Lipid panel; Future

## 2025-01-16 ENCOUNTER — TELEPHONE (OUTPATIENT)
Dept: INTERNAL MEDICINE CLINIC | Facility: CLINIC | Age: 50
End: 2025-01-16

## 2025-01-16 NOTE — TELEPHONE ENCOUNTER
Please call patient.  Your thyroid medication was recently recalled and you may have received this recently.  Please call your pharmacy if your batch is part of the recall and to have it replaced.

## 2025-01-30 DIAGNOSIS — J45.20 MILD INTERMITTENT REACTIVE AIRWAY DISEASE WITHOUT COMPLICATION: ICD-10-CM

## 2025-01-31 RX ORDER — ALBUTEROL SULFATE 90 UG/1
1-2 INHALANT RESPIRATORY (INHALATION) EVERY 6 HOURS PRN
Qty: 8 G | Refills: 1 | Status: SHIPPED | OUTPATIENT
Start: 2025-01-31

## 2025-02-17 DIAGNOSIS — E03.9 ACQUIRED HYPOTHYROIDISM: ICD-10-CM

## 2025-02-17 RX ORDER — LEVOTHYROXINE SODIUM 25 UG/1
25 TABLET ORAL DAILY
Qty: 90 TABLET | Refills: 1 | Status: SHIPPED | OUTPATIENT
Start: 2025-02-17

## 2025-02-26 DIAGNOSIS — I10 ESSENTIAL HYPERTENSION: ICD-10-CM

## 2025-02-27 RX ORDER — BISOPROLOL FUMARATE AND HYDROCHLOROTHIAZIDE 2.5; 6.25 MG/1; MG/1
1 TABLET ORAL DAILY
Qty: 30 TABLET | Refills: 0 | Status: SHIPPED | OUTPATIENT
Start: 2025-02-27

## 2025-02-27 RX ORDER — SPIRONOLACTONE 100 MG/1
100 TABLET, FILM COATED ORAL DAILY
Qty: 30 TABLET | Refills: 0 | Status: SHIPPED | OUTPATIENT
Start: 2025-02-27

## 2025-05-01 LAB
25(OH)D3 SERPL-MCNC: 62 NG/ML (ref 30–100)
ALBUMIN SERPL-MCNC: 4.5 G/DL (ref 3.6–5.1)
ALBUMIN/GLOB SERPL: 1.7 (CALC) (ref 1–2.5)
ALP SERPL-CCNC: 48 U/L (ref 37–153)
ALT SERPL-CCNC: 18 U/L (ref 6–29)
AST SERPL-CCNC: 21 U/L (ref 10–35)
BASOPHILS # BLD AUTO: 59 CELLS/UL (ref 0–200)
BASOPHILS NFR BLD AUTO: 1 %
BILIRUB SERPL-MCNC: 1.3 MG/DL (ref 0.2–1.2)
BUN SERPL-MCNC: 13 MG/DL (ref 7–25)
BUN/CREAT SERPL: ABNORMAL (CALC) (ref 6–22)
CALCIUM SERPL-MCNC: 9.4 MG/DL (ref 8.6–10.4)
CHLORIDE SERPL-SCNC: 99 MMOL/L (ref 98–110)
CHOLEST SERPL-MCNC: 216 MG/DL
CHOLEST/HDLC SERPL: 6.2 (CALC)
CO2 SERPL-SCNC: 26 MMOL/L (ref 20–32)
CREAT SERPL-MCNC: 0.75 MG/DL (ref 0.5–1.03)
EOSINOPHIL # BLD AUTO: 83 CELLS/UL (ref 15–500)
EOSINOPHIL NFR BLD AUTO: 1.4 %
ERYTHROCYTE [DISTWIDTH] IN BLOOD BY AUTOMATED COUNT: 17.1 % (ref 11–15)
GFR/BSA.PRED SERPLBLD CYS-BASED-ARV: 96 ML/MIN/1.73M2
GLOBULIN SER CALC-MCNC: 2.7 G/DL (CALC) (ref 1.9–3.7)
GLUCOSE SERPL-MCNC: 74 MG/DL (ref 65–99)
HCT VFR BLD AUTO: 43.9 % (ref 35–45)
HDLC SERPL-MCNC: 35 MG/DL
HGB BLD-MCNC: 14.3 G/DL (ref 11.7–15.5)
LDLC SERPL CALC-MCNC: 145 MG/DL (CALC)
LYMPHOCYTES # BLD AUTO: 2159 CELLS/UL (ref 850–3900)
LYMPHOCYTES NFR BLD AUTO: 36.6 %
MCH RBC QN AUTO: 26.8 PG (ref 27–33)
MCHC RBC AUTO-ENTMCNC: 32.6 G/DL (ref 32–36)
MCV RBC AUTO: 82.2 FL (ref 80–100)
MONOCYTES # BLD AUTO: 443 CELLS/UL (ref 200–950)
MONOCYTES NFR BLD AUTO: 7.5 %
NEUTROPHILS # BLD AUTO: 3157 CELLS/UL (ref 1500–7800)
NEUTROPHILS NFR BLD AUTO: 53.5 %
NONHDLC SERPL-MCNC: 181 MG/DL (CALC)
PLATELET # BLD AUTO: 188 THOUSAND/UL (ref 140–400)
PMV BLD REES-ECKER: 10.6 FL (ref 7.5–12.5)
POTASSIUM SERPL-SCNC: 4.2 MMOL/L (ref 3.5–5.3)
PROT SERPL-MCNC: 7.2 G/DL (ref 6.1–8.1)
RBC # BLD AUTO: 5.34 MILLION/UL (ref 3.8–5.1)
SODIUM SERPL-SCNC: 135 MMOL/L (ref 135–146)
TRIGL SERPL-MCNC: 215 MG/DL
TSH SERPL-ACNC: 3.34 MIU/L
WBC # BLD AUTO: 5.9 THOUSAND/UL (ref 3.8–10.8)

## 2025-05-05 ENCOUNTER — RESULTS FOLLOW-UP (OUTPATIENT)
Dept: INTERNAL MEDICINE CLINIC | Facility: CLINIC | Age: 50
End: 2025-05-05

## 2025-06-17 DIAGNOSIS — I10 ESSENTIAL HYPERTENSION: ICD-10-CM

## 2025-06-18 RX ORDER — SPIRONOLACTONE 100 MG/1
100 TABLET, FILM COATED ORAL DAILY
Qty: 90 TABLET | Refills: 1 | Status: SHIPPED | OUTPATIENT
Start: 2025-06-18

## 2025-06-18 RX ORDER — BISOPROLOL FUMARATE AND HYDROCHLOROTHIAZIDE 2.5; 6.25 MG/1; MG/1
1 TABLET ORAL DAILY
Qty: 90 TABLET | Refills: 1 | Status: SHIPPED | OUTPATIENT
Start: 2025-06-18

## 2025-08-17 DIAGNOSIS — E03.9 ACQUIRED HYPOTHYROIDISM: ICD-10-CM

## 2025-08-19 RX ORDER — LEVOTHYROXINE SODIUM 25 UG/1
25 TABLET ORAL DAILY
Qty: 90 TABLET | Refills: 1 | Status: SHIPPED | OUTPATIENT
Start: 2025-08-19

## 2025-08-19 RX ORDER — LEVOTHYROXINE SODIUM 25 UG/1
25 TABLET ORAL DAILY
Qty: 90 TABLET | Refills: 0 | OUTPATIENT
Start: 2025-08-19